# Patient Record
Sex: MALE | Race: BLACK OR AFRICAN AMERICAN | NOT HISPANIC OR LATINO | Employment: FULL TIME | URBAN - METROPOLITAN AREA
[De-identification: names, ages, dates, MRNs, and addresses within clinical notes are randomized per-mention and may not be internally consistent; named-entity substitution may affect disease eponyms.]

---

## 2019-12-13 ENCOUNTER — HOSPITAL ENCOUNTER (EMERGENCY)
Facility: HOSPITAL | Age: 34
Discharge: LEFT BEFORE DISCHARGE | End: 2019-12-14
Attending: EMERGENCY MEDICINE

## 2019-12-13 DIAGNOSIS — N17.9 AKI (ACUTE KIDNEY INJURY): ICD-10-CM

## 2019-12-13 DIAGNOSIS — R25.2 SPASM: Primary | ICD-10-CM

## 2019-12-13 DIAGNOSIS — R79.89 ELEVATED TROPONIN: ICD-10-CM

## 2019-12-13 DIAGNOSIS — R74.01 TRANSAMINITIS: ICD-10-CM

## 2019-12-13 DIAGNOSIS — R94.31 ABNORMAL EKG: ICD-10-CM

## 2019-12-13 PROCEDURE — 99284 EMERGENCY DEPT VISIT MOD MDM: CPT | Mod: 25

## 2019-12-13 PROCEDURE — 82962 GLUCOSE BLOOD TEST: CPT

## 2019-12-13 PROCEDURE — 96360 HYDRATION IV INFUSION INIT: CPT

## 2019-12-13 RX ORDER — INSULIN ASPART 100 [IU]/ML
INJECTION, SOLUTION INTRAVENOUS; SUBCUTANEOUS
COMMUNITY

## 2019-12-14 VITALS
WEIGHT: 225 LBS | BODY MASS INDEX: 28.88 KG/M2 | RESPIRATION RATE: 15 BRPM | HEIGHT: 74 IN | TEMPERATURE: 99 F | HEART RATE: 94 BPM | DIASTOLIC BLOOD PRESSURE: 64 MMHG | OXYGEN SATURATION: 98 % | SYSTOLIC BLOOD PRESSURE: 154 MMHG

## 2019-12-14 LAB
ALBUMIN SERPL BCP-MCNC: 2.6 G/DL (ref 3.5–5.2)
ALP SERPL-CCNC: 280 U/L (ref 55–135)
ALT SERPL W/O P-5'-P-CCNC: 788 U/L (ref 10–44)
ANION GAP SERPL CALC-SCNC: 12 MMOL/L (ref 8–16)
AST SERPL-CCNC: 313 U/L (ref 10–40)
BASOPHILS # BLD AUTO: 0.04 K/UL (ref 0–0.2)
BASOPHILS NFR BLD: 0.4 % (ref 0–1.9)
BILIRUB SERPL-MCNC: 0.4 MG/DL (ref 0.1–1)
BUN SERPL-MCNC: 27 MG/DL (ref 6–20)
CALCIUM SERPL-MCNC: 9.3 MG/DL (ref 8.7–10.5)
CHLORIDE SERPL-SCNC: 100 MMOL/L (ref 95–110)
CK SERPL-CCNC: 136 U/L (ref 20–200)
CO2 SERPL-SCNC: 21 MMOL/L (ref 23–29)
CREAT SERPL-MCNC: 2.6 MG/DL (ref 0.5–1.4)
DIFFERENTIAL METHOD: ABNORMAL
EOSINOPHIL # BLD AUTO: 0.2 K/UL (ref 0–0.5)
EOSINOPHIL NFR BLD: 1.6 % (ref 0–8)
ERYTHROCYTE [DISTWIDTH] IN BLOOD BY AUTOMATED COUNT: 13.2 % (ref 11.5–14.5)
EST. GFR  (AFRICAN AMERICAN): 36 ML/MIN/1.73 M^2
EST. GFR  (NON AFRICAN AMERICAN): 31 ML/MIN/1.73 M^2
GLUCOSE SERPL-MCNC: 410 MG/DL (ref 70–110)
HCT VFR BLD AUTO: 36.9 % (ref 40–54)
HGB BLD-MCNC: 12.5 G/DL (ref 14–18)
IMM GRANULOCYTES # BLD AUTO: 0.05 K/UL (ref 0–0.04)
IMM GRANULOCYTES NFR BLD AUTO: 0.5 % (ref 0–0.5)
LYMPHOCYTES # BLD AUTO: 2.3 K/UL (ref 1–4.8)
LYMPHOCYTES NFR BLD: 23.9 % (ref 18–48)
MAGNESIUM SERPL-MCNC: 2.1 MG/DL (ref 1.6–2.6)
MCH RBC QN AUTO: 27.4 PG (ref 27–31)
MCHC RBC AUTO-ENTMCNC: 33.9 G/DL (ref 32–36)
MCV RBC AUTO: 81 FL (ref 82–98)
MONOCYTES # BLD AUTO: 0.8 K/UL (ref 0.3–1)
MONOCYTES NFR BLD: 8.6 % (ref 4–15)
NEUTROPHILS # BLD AUTO: 6.4 K/UL (ref 1.8–7.7)
NEUTROPHILS NFR BLD: 65 % (ref 38–73)
NRBC BLD-RTO: 0 /100 WBC
PLATELET # BLD AUTO: 244 K/UL (ref 150–350)
PMV BLD AUTO: 11.8 FL (ref 9.2–12.9)
POCT GLUCOSE: 390 MG/DL (ref 70–110)
POTASSIUM SERPL-SCNC: 4.7 MMOL/L (ref 3.5–5.1)
PROT SERPL-MCNC: 6.6 G/DL (ref 6–8.4)
RBC # BLD AUTO: 4.56 M/UL (ref 4.6–6.2)
SODIUM SERPL-SCNC: 133 MMOL/L (ref 136–145)
TROPONIN I SERPL DL<=0.01 NG/ML-MCNC: 0.04 NG/ML (ref 0–0.03)
WBC # BLD AUTO: 9.81 K/UL (ref 3.9–12.7)

## 2019-12-14 PROCEDURE — 83735 ASSAY OF MAGNESIUM: CPT

## 2019-12-14 PROCEDURE — 85025 COMPLETE CBC W/AUTO DIFF WBC: CPT

## 2019-12-14 PROCEDURE — 84484 ASSAY OF TROPONIN QUANT: CPT

## 2019-12-14 PROCEDURE — 63600175 PHARM REV CODE 636 W HCPCS: Performed by: EMERGENCY MEDICINE

## 2019-12-14 PROCEDURE — 82550 ASSAY OF CK (CPK): CPT

## 2019-12-14 PROCEDURE — 80053 COMPREHEN METABOLIC PANEL: CPT

## 2019-12-14 RX ADMIN — SODIUM CHLORIDE 1000 ML: 0.9 INJECTION, SOLUTION INTRAVENOUS at 12:12

## 2019-12-14 NOTE — ED NOTES
Pt and spouse at bedside verbalize understanding of AMA and sign from. All questions answered at this time. Pt states he will follow up with his PCP on Monday. No distress is noted at this time. Pt leaving with family.

## 2019-12-14 NOTE — ED NOTES
Plan of care discussed with pt. Pt states he does not want to be admitted at this time and will leave against medical advice. MD made aware.

## 2019-12-14 NOTE — ED PROVIDER NOTES
Encounter Date: 12/13/2019    SCRIBE #1 NOTE: I, Janellekaitlin Holden, am scribing for, and in the presence of,  Lewis Sheldon MD. I have scribed the following portions of the note - Other sections scribed: HPI, ROS, PE.       History     Chief Complaint   Patient presents with    Spasms     reports having muscle cramps to entire body for the past 1 hour after drinking a daiquiri tonight      CC: Myalgias    HPI: The patient is a 34 y.o male who presents to the ED, per EMS, complaining of cramping, generalized myalgias that began about 1 hr PTA. He states that cramps began at his feet, then migrated towards his upper extremities. Patient reports crying due to severity of pain. He states that he attempted to alleviate symptoms via walking, with no relief. Hx of similar muscles aches. Symptoms have improved some since arrival. He denies abdominal pain, CP, and leg swelling. PMHx of HTN and DM. Patient was diagnosed at 22 y.o. He takes lantus and novlog for management. SHx of smoking cigarettes and alcohol consumption. No SHx of drug use.     The history is provided by the patient. No  was used.     Review of patient's allergies indicates:  No Known Allergies  Past Medical History:   Diagnosis Date    Diabetes mellitus     Hypertension      History reviewed. No pertinent surgical history.  History reviewed. No pertinent family history.  Social History     Tobacco Use    Smoking status: Current Every Day Smoker    Smokeless tobacco: Never Used   Substance Use Topics    Alcohol use: Yes    Drug use: Never     Review of Systems   Constitutional: Negative for fever.   HENT: Negative for sore throat.    Respiratory: Negative for shortness of breath.    Cardiovascular: Negative for chest pain and leg swelling.   Gastrointestinal: Negative for abdominal pain.   Genitourinary: Negative for dysuria.   Musculoskeletal: Positive for myalgias (Generalized). Negative for back pain.   Skin: Negative for  rash.   Neurological: Negative for headaches.   Psychiatric/Behavioral: Negative for confusion.       Physical Exam     Initial Vitals [12/13/19 2301]   BP Pulse Resp Temp SpO2   (!) 210/100 (!) 120 20 98.5 °F (36.9 °C) 98 %      MAP       --         Physical Exam    Nursing note and vitals reviewed.  Constitutional: He appears well-developed and well-nourished. He is not diaphoretic. No distress.   HENT:   Head: Normocephalic and atraumatic.   Mouth/Throat: Oropharynx is clear and moist. Mucous membranes are dry.   Patient has dry mucous membranes.    Eyes: Conjunctivae and EOM are normal. Pupils are equal, round, and reactive to light. Right eye exhibits no discharge. Left eye exhibits no discharge. No scleral icterus.   Neck: Normal range of motion. Neck supple.   Cardiovascular: Normal rate, regular rhythm, normal heart sounds and intact distal pulses. Exam reveals no gallop and no friction rub.    No murmur heard.  Pulmonary/Chest: Breath sounds normal. No stridor. No respiratory distress. He has no wheezes. He has no rhonchi. He has no rales.   Abdominal: Soft. Bowel sounds are normal. He exhibits no distension. There is no tenderness. There is no rebound and no guarding.   Musculoskeletal: Normal range of motion. He exhibits no edema or tenderness.   Neurological: He is alert and oriented to person, place, and time. He has normal strength. No cranial nerve deficit.   Skin: Skin is warm and dry. No rash noted.   Psychiatric: He has a normal mood and affect. His behavior is normal. Judgment and thought content normal.         ED Course   Procedures  Labs Reviewed   CBC W/ AUTO DIFFERENTIAL - Abnormal; Notable for the following components:       Result Value    RBC 4.56 (*)     Hemoglobin 12.5 (*)     Hematocrit 36.9 (*)     Mean Corpuscular Volume 81 (*)     Immature Grans (Abs) 0.05 (*)     All other components within normal limits   COMPREHENSIVE METABOLIC PANEL - Abnormal; Notable for the following  components:    Sodium 133 (*)     CO2 21 (*)     Glucose 410 (*)     BUN, Bld 27 (*)     Creatinine 2.6 (*)     Albumin 2.6 (*)     Alkaline Phosphatase 280 (*)      (*)      (*)     eGFR if  36 (*)     eGFR if non  31 (*)     All other components within normal limits   TROPONIN I - Abnormal; Notable for the following components:    Troponin I 0.045 (*)     All other components within normal limits   POCT GLUCOSE - Abnormal; Notable for the following components:    POCT Glucose 390 (*)     All other components within normal limits   MAGNESIUM   CK   POCT GLUCOSE MONITORING CONTINUOUS          Imaging Results    None          Medical Decision Making:   Initial Assessment:   34-year-old male presenting with body cramping.  Currently asymptomatic.  Vitals with minimal hypertension.  EKG shows minimal tachycardia, sinus, rate 101, T-wave inversions in 2 3 AVF as well as V 4-V6.  Unclear if he EKG changes are new or old as there are no priors to compare with.  Labs with multiple abnormalities including elevated troponin, kidney insufficiency, transaminitis.  Unclear what his symptoms were however there are multiple concerning findings.  I recommend to the patient that he be observed especially given his EKG changes and elevated troponin.  The patient elected to sign out against medical advice.  I attempted to convey the gravity of the situation and the unknown cause of his symptoms.  The patient states he will follow up with his primary care and only desires to go home.  I have no reason to suspect the patient does not have capability to understand the consequences of his actions. He did not appear intoxicated, deranged, or altered. Patient encouraged to return for any new or worsening condition.    Clinical Tests:   Lab Tests: Ordered and Reviewed            Scribe Attestation:   Scribe #1: I performed the above scribed service and the documentation accurately describes  the services I performed. I attest to the accuracy of the note.              I, Lewis Sheldon, personally performed the services described in this documentation. All medical record entries made by the scribe were at my direction and in my presence.  I have reviewed the chart and agree that the record reflects my personal performance and is accurate and complete.               Clinical Impression:       ICD-10-CM ICD-9-CM   1. Spasm R25.2 781.0   2. NORAH (acute kidney injury) N17.9 584.9   3. Abnormal EKG R94.31 794.31   4. Elevated troponin R79.89 790.6   5. Transaminitis R74.0 790.4         Disposition:   Disposition: AMA  Condition: Stable                     Lewis Sheldon MD  12/14/19 0338

## 2019-12-14 NOTE — ED TRIAGE NOTES
Pt presents to ED via EMS with c/o muscle cramping for the past month and it got worse tonight. He reports tonight he drank a daiquiri and shortly after began having cramping from his feet to his arms. At this time pt reports only cramping in his feet. Pt reports he worked outside on his car today but did drink lots of water. No distress is noted at this time. Will continue to be monitored.

## 2019-12-14 NOTE — DISCHARGE INSTRUCTIONS
You were seen in the emergency department for cramps.  Your EKG is concerning for changes that could be due to heart disease or a prior heart attack. Your kidneys have been damaged. Your liver is not function as it should and could be due to liver disease or infection. We have recommended to stay in the hospital to ensure these problems are not getting worse and are not caused by a dangerous cause such as a heart attack. You have decided to leave against medication advice. Please follow-up with your primary care provider early this week.  Please return for any new or worsening chest pain, nausea, vomiting, difficulty breathing, coughing up blood, profuse sweating, dizziness, lightheadedness, numbness, weakness, or any other new or worsening concerns.

## 2020-07-24 ENCOUNTER — HOSPITAL ENCOUNTER (INPATIENT)
Facility: HOSPITAL | Age: 35
LOS: 7 days | Discharge: HOME | DRG: 853 | End: 2020-07-31
Attending: EMERGENCY MEDICINE | Admitting: EMERGENCY MEDICINE
Payer: MEDICAID

## 2020-07-24 DIAGNOSIS — L03.116 CELLULITIS OF LEFT LOWER EXTREMITY: ICD-10-CM

## 2020-07-24 DIAGNOSIS — D72.829 LEUKOCYTOSIS, UNSPECIFIED TYPE: ICD-10-CM

## 2020-07-24 DIAGNOSIS — A49.01 MSSA (METHICILLIN SUSCEPTIBLE STAPHYLOCOCCUS AUREUS): Primary | ICD-10-CM

## 2020-07-24 DIAGNOSIS — N17.9 AKI (ACUTE KIDNEY INJURY): ICD-10-CM

## 2020-07-24 DIAGNOSIS — L02.416 ABSCESS OF LEFT THIGH: ICD-10-CM

## 2020-07-24 PROBLEM — R79.89 ELEVATED SERUM CREATININE: Status: ACTIVE | Noted: 2020-07-24

## 2020-07-24 PROBLEM — E78.5 HLD (HYPERLIPIDEMIA): Status: ACTIVE | Noted: 2020-07-24

## 2020-07-24 PROBLEM — E10.9 DIABETES MELLITUS TYPE 1: Status: ACTIVE | Noted: 2020-07-24

## 2020-07-24 PROBLEM — A41.9 SEPSIS: Status: ACTIVE | Noted: 2020-07-24

## 2020-07-24 PROBLEM — I10 ESSENTIAL HYPERTENSION: Status: ACTIVE | Noted: 2020-07-24

## 2020-07-24 LAB
ALBUMIN SERPL BCP-MCNC: 1.6 G/DL (ref 3.5–5.2)
ALP SERPL-CCNC: 106 U/L (ref 55–135)
ALT SERPL W/O P-5'-P-CCNC: 6 U/L (ref 10–44)
ANION GAP SERPL CALC-SCNC: 9 MMOL/L (ref 8–16)
AST SERPL-CCNC: 12 U/L (ref 10–40)
BASOPHILS # BLD AUTO: 0.04 K/UL (ref 0–0.2)
BASOPHILS NFR BLD: 0.2 % (ref 0–1.9)
BILIRUB SERPL-MCNC: 0.3 MG/DL (ref 0.1–1)
BUN SERPL-MCNC: 33 MG/DL (ref 6–20)
CALCIUM SERPL-MCNC: 8.5 MG/DL (ref 8.7–10.5)
CHLORIDE SERPL-SCNC: 103 MMOL/L (ref 95–110)
CO2 SERPL-SCNC: 21 MMOL/L (ref 23–29)
CREAT SERPL-MCNC: 2.1 MG/DL (ref 0.5–1.4)
DIFFERENTIAL METHOD: ABNORMAL
EOSINOPHIL # BLD AUTO: 0 K/UL (ref 0–0.5)
EOSINOPHIL NFR BLD: 0.1 % (ref 0–8)
ERYTHROCYTE [DISTWIDTH] IN BLOOD BY AUTOMATED COUNT: 12.3 % (ref 11.5–14.5)
EST. GFR  (AFRICAN AMERICAN): 46 ML/MIN/1.73 M^2
EST. GFR  (NON AFRICAN AMERICAN): 40 ML/MIN/1.73 M^2
GLUCOSE SERPL-MCNC: 160 MG/DL (ref 70–110)
GLUCOSE SERPL-MCNC: 202 MG/DL (ref 70–110)
HCT VFR BLD AUTO: 34 % (ref 40–54)
HGB BLD-MCNC: 11.6 G/DL (ref 14–18)
IMM GRANULOCYTES # BLD AUTO: 0.35 K/UL (ref 0–0.04)
IMM GRANULOCYTES NFR BLD AUTO: 1.6 % (ref 0–0.5)
LACTATE SERPL-SCNC: 1.5 MMOL/L (ref 0.5–2.2)
LYMPHOCYTES # BLD AUTO: 1.7 K/UL (ref 1–4.8)
LYMPHOCYTES NFR BLD: 7.6 % (ref 18–48)
MCH RBC QN AUTO: 28.2 PG (ref 27–31)
MCHC RBC AUTO-ENTMCNC: 34.1 G/DL (ref 32–36)
MCV RBC AUTO: 83 FL (ref 82–98)
MONOCYTES # BLD AUTO: 1.6 K/UL (ref 0.3–1)
MONOCYTES NFR BLD: 7 % (ref 4–15)
NEUTROPHILS # BLD AUTO: 18.8 K/UL (ref 1.8–7.7)
NEUTROPHILS NFR BLD: 83.5 % (ref 38–73)
NRBC BLD-RTO: 0 /100 WBC
PLATELET # BLD AUTO: 222 K/UL (ref 150–350)
PMV BLD AUTO: 11.9 FL (ref 9.2–12.9)
POCT GLUCOSE: 146 MG/DL (ref 70–110)
POCT GLUCOSE: 202 MG/DL (ref 70–110)
POTASSIUM SERPL-SCNC: 3.4 MMOL/L (ref 3.5–5.1)
PROT SERPL-MCNC: 6.4 G/DL (ref 6–8.4)
RBC # BLD AUTO: 4.12 M/UL (ref 4.6–6.2)
SARS-COV-2 RDRP RESP QL NAA+PROBE: NEGATIVE
SODIUM SERPL-SCNC: 133 MMOL/L (ref 136–145)
WBC # BLD AUTO: 22.45 K/UL (ref 3.9–12.7)

## 2020-07-24 PROCEDURE — 96372 THER/PROPH/DIAG INJ SC/IM: CPT | Mod: 59

## 2020-07-24 PROCEDURE — G0378 HOSPITAL OBSERVATION PER HR: HCPCS

## 2020-07-24 PROCEDURE — 25000003 PHARM REV CODE 250: Performed by: NURSE PRACTITIONER

## 2020-07-24 PROCEDURE — 25000003 PHARM REV CODE 250: Performed by: PHYSICIAN ASSISTANT

## 2020-07-24 PROCEDURE — 63600175 PHARM REV CODE 636 W HCPCS: Performed by: PHYSICIAN ASSISTANT

## 2020-07-24 PROCEDURE — 85025 COMPLETE CBC W/AUTO DIFF WBC: CPT

## 2020-07-24 PROCEDURE — 87186 SC STD MICRODIL/AGAR DIL: CPT

## 2020-07-24 PROCEDURE — 99285 EMERGENCY DEPT VISIT HI MDM: CPT | Mod: 25

## 2020-07-24 PROCEDURE — C9399 UNCLASSIFIED DRUGS OR BIOLOG: HCPCS | Performed by: SURGERY

## 2020-07-24 PROCEDURE — 87077 CULTURE AEROBIC IDENTIFY: CPT

## 2020-07-24 PROCEDURE — U0002 COVID-19 LAB TEST NON-CDC: HCPCS

## 2020-07-24 PROCEDURE — 96375 TX/PRO/DX INJ NEW DRUG ADDON: CPT

## 2020-07-24 PROCEDURE — 25000003 PHARM REV CODE 250: Performed by: SURGERY

## 2020-07-24 PROCEDURE — C9399 UNCLASSIFIED DRUGS OR BIOLOG: HCPCS | Performed by: PHYSICIAN ASSISTANT

## 2020-07-24 PROCEDURE — 25500020 PHARM REV CODE 255: Performed by: EMERGENCY MEDICINE

## 2020-07-24 PROCEDURE — 96376 TX/PRO/DX INJ SAME DRUG ADON: CPT

## 2020-07-24 PROCEDURE — 83605 ASSAY OF LACTIC ACID: CPT

## 2020-07-24 PROCEDURE — 11000001 HC ACUTE MED/SURG PRIVATE ROOM

## 2020-07-24 PROCEDURE — 10060 I&D ABSCESS SIMPLE/SINGLE: CPT

## 2020-07-24 PROCEDURE — 87070 CULTURE OTHR SPECIMN AEROBIC: CPT

## 2020-07-24 PROCEDURE — 87040 BLOOD CULTURE FOR BACTERIA: CPT | Mod: 59

## 2020-07-24 PROCEDURE — 96365 THER/PROPH/DIAG IV INF INIT: CPT

## 2020-07-24 PROCEDURE — S0030 INJECTION, METRONIDAZOLE: HCPCS | Performed by: PHYSICIAN ASSISTANT

## 2020-07-24 PROCEDURE — 82962 GLUCOSE BLOOD TEST: CPT

## 2020-07-24 PROCEDURE — 83036 HEMOGLOBIN GLYCOSYLATED A1C: CPT

## 2020-07-24 PROCEDURE — 80053 COMPREHEN METABOLIC PANEL: CPT

## 2020-07-24 RX ORDER — INSULIN ASPART 100 [IU]/ML
0-5 INJECTION, SOLUTION INTRAVENOUS; SUBCUTANEOUS
Status: DISCONTINUED | OUTPATIENT
Start: 2020-07-24 | End: 2020-07-31 | Stop reason: HOSPADM

## 2020-07-24 RX ORDER — ACETAMINOPHEN 325 MG/1
650 TABLET ORAL EVERY 8 HOURS PRN
Status: DISCONTINUED | OUTPATIENT
Start: 2020-07-24 | End: 2020-07-31 | Stop reason: HOSPADM

## 2020-07-24 RX ORDER — IBUPROFEN 200 MG
24 TABLET ORAL
Status: DISCONTINUED | OUTPATIENT
Start: 2020-07-24 | End: 2020-07-31 | Stop reason: HOSPADM

## 2020-07-24 RX ORDER — METRONIDAZOLE 500 MG/100ML
500 INJECTION, SOLUTION INTRAVENOUS
Status: DISCONTINUED | OUTPATIENT
Start: 2020-07-24 | End: 2020-07-25

## 2020-07-24 RX ORDER — ATORVASTATIN CALCIUM 10 MG/1
20 TABLET, FILM COATED ORAL NIGHTLY
Status: DISCONTINUED | OUTPATIENT
Start: 2020-07-24 | End: 2020-07-31 | Stop reason: HOSPADM

## 2020-07-24 RX ORDER — MORPHINE SULFATE 10 MG/ML
4 INJECTION INTRAMUSCULAR; INTRAVENOUS; SUBCUTANEOUS
Status: COMPLETED | OUTPATIENT
Start: 2020-07-24 | End: 2020-07-24

## 2020-07-24 RX ORDER — OXYCODONE AND ACETAMINOPHEN 5; 325 MG/1; MG/1
1 TABLET ORAL EVERY 4 HOURS PRN
Status: DISCONTINUED | OUTPATIENT
Start: 2020-07-24 | End: 2020-07-31 | Stop reason: HOSPADM

## 2020-07-24 RX ORDER — SODIUM CHLORIDE 9 MG/ML
INJECTION, SOLUTION INTRAVENOUS CONTINUOUS
Status: DISCONTINUED | OUTPATIENT
Start: 2020-07-24 | End: 2020-07-26

## 2020-07-24 RX ORDER — MORPHINE SULFATE 10 MG/ML
2 INJECTION INTRAMUSCULAR; INTRAVENOUS; SUBCUTANEOUS EVERY 4 HOURS PRN
Status: DISCONTINUED | OUTPATIENT
Start: 2020-07-24 | End: 2020-07-24

## 2020-07-24 RX ORDER — SODIUM CHLORIDE 0.9 % (FLUSH) 0.9 %
10 SYRINGE (ML) INJECTION
Status: DISCONTINUED | OUTPATIENT
Start: 2020-07-24 | End: 2020-07-31 | Stop reason: HOSPADM

## 2020-07-24 RX ORDER — MORPHINE SULFATE 10 MG/ML
2 INJECTION INTRAMUSCULAR; INTRAVENOUS; SUBCUTANEOUS
Status: COMPLETED | OUTPATIENT
Start: 2020-07-24 | End: 2020-07-24

## 2020-07-24 RX ORDER — AMOXICILLIN 250 MG
1 CAPSULE ORAL DAILY PRN
Status: DISCONTINUED | OUTPATIENT
Start: 2020-07-24 | End: 2020-07-31 | Stop reason: HOSPADM

## 2020-07-24 RX ORDER — GLUCAGON 1 MG
1 KIT INJECTION
Status: DISCONTINUED | OUTPATIENT
Start: 2020-07-24 | End: 2020-07-31 | Stop reason: HOSPADM

## 2020-07-24 RX ORDER — ONDANSETRON 2 MG/ML
4 INJECTION INTRAMUSCULAR; INTRAVENOUS EVERY 8 HOURS PRN
Status: DISCONTINUED | OUTPATIENT
Start: 2020-07-24 | End: 2020-07-31 | Stop reason: HOSPADM

## 2020-07-24 RX ORDER — HYDRALAZINE HYDROCHLORIDE 20 MG/ML
10 INJECTION INTRAMUSCULAR; INTRAVENOUS EVERY 8 HOURS PRN
Status: DISCONTINUED | OUTPATIENT
Start: 2020-07-24 | End: 2020-07-29

## 2020-07-24 RX ORDER — ACETAMINOPHEN 500 MG
1000 TABLET ORAL
Status: COMPLETED | OUTPATIENT
Start: 2020-07-24 | End: 2020-07-24

## 2020-07-24 RX ORDER — CLINDAMYCIN PHOSPHATE 600 MG/50ML
600 INJECTION, SOLUTION INTRAVENOUS
Status: COMPLETED | OUTPATIENT
Start: 2020-07-24 | End: 2020-07-24

## 2020-07-24 RX ORDER — MORPHINE SULFATE 10 MG/ML
4 INJECTION INTRAMUSCULAR; INTRAVENOUS; SUBCUTANEOUS EVERY 4 HOURS PRN
Status: DISCONTINUED | OUTPATIENT
Start: 2020-07-24 | End: 2020-07-24

## 2020-07-24 RX ORDER — LIDOCAINE HYDROCHLORIDE 10 MG/ML
10 INJECTION INFILTRATION; PERINEURAL
Status: COMPLETED | OUTPATIENT
Start: 2020-07-24 | End: 2020-07-24

## 2020-07-24 RX ORDER — IBUPROFEN 200 MG
16 TABLET ORAL
Status: DISCONTINUED | OUTPATIENT
Start: 2020-07-24 | End: 2020-07-31 | Stop reason: HOSPADM

## 2020-07-24 RX ADMIN — IOHEXOL 100 ML: 350 INJECTION, SOLUTION INTRAVENOUS at 04:07

## 2020-07-24 RX ADMIN — DOXYCYCLINE 100 MG: 100 INJECTION, POWDER, LYOPHILIZED, FOR SOLUTION INTRAVENOUS at 07:07

## 2020-07-24 RX ADMIN — SODIUM CHLORIDE: 0.9 INJECTION, SOLUTION INTRAVENOUS at 08:07

## 2020-07-24 RX ADMIN — LIDOCAINE HYDROCHLORIDE 10 ML: 10 INJECTION, SOLUTION INFILTRATION; PERINEURAL at 11:07

## 2020-07-24 RX ADMIN — CEFTRIAXONE 2 G: 2 INJECTION, SOLUTION INTRAVENOUS at 09:07

## 2020-07-24 RX ADMIN — METRONIDAZOLE 500 MG: 500 INJECTION, SOLUTION INTRAVENOUS at 07:07

## 2020-07-24 RX ADMIN — OXYCODONE HYDROCHLORIDE AND ACETAMINOPHEN 1 TABLET: 5; 325 TABLET ORAL at 08:07

## 2020-07-24 RX ADMIN — MORPHINE SULFATE 2 MG: 10 INJECTION INTRAVENOUS at 05:07

## 2020-07-24 RX ADMIN — ATORVASTATIN CALCIUM 20 MG: 10 TABLET, FILM COATED ORAL at 08:07

## 2020-07-24 RX ADMIN — MORPHINE SULFATE 4 MG: 10 INJECTION INTRAVENOUS at 03:07

## 2020-07-24 RX ADMIN — INSULIN DETEMIR 5 UNITS: 100 INJECTION, SOLUTION SUBCUTANEOUS at 09:07

## 2020-07-24 RX ADMIN — CLINDAMYCIN IN 5 PERCENT DEXTROSE 600 MG: 12 INJECTION, SOLUTION INTRAVENOUS at 01:07

## 2020-07-24 RX ADMIN — MORPHINE SULFATE 4 MG: 10 INJECTION INTRAVENOUS at 01:07

## 2020-07-24 RX ADMIN — ACETAMINOPHEN 650 MG: 325 TABLET ORAL at 11:07

## 2020-07-24 RX ADMIN — ACETAMINOPHEN 1000 MG: 500 TABLET ORAL at 01:07

## 2020-07-24 NOTE — HPI
Chas Henao 35 y.o. male with type 1 diabetes, HTN, and HLD presents to the hospital with a chief complaint of leg pain. He reports 1 week ago he saw what he thought was a pimple to his left medial thigh that he attempted to express without success and the abscess expanded. Over the last week he has experienced an increasing constant throbbing pain without radiation improved with I&D of abscess in the ED and worsened with ambulation. He denies any trauma or injury to the leg. He has not lost control of his bowel/bladder. He denies fever, chest pain, SOB, N/V, abdominal pain, dysuria, dizziness, syncope.     In the ED, tachycardic to 105, WBC of 22, abscess seen on CT, lactic acid normal, Cr of 2.1.

## 2020-07-24 NOTE — HOSPITAL COURSE
Chas Henao 35 y.o. male placed in observation for left upper thigh abscess, NORAH and sepsis.  I&D in ED. Rocephin/Flagyl/doxy started on admission. Patient received CT with contrast on admission. General surgery and Nephrology consulted. US renal showed normal side kidneys, bilateral kidneys and bilateral elevated renal indices. 7/25 Underwent I & D of left upper thigh area that showed some necrotizing fasciitis.  Antibiotics switched to vancomycin and meropenem IV.  Renal function continued to worsen.  On 07/26 urine output approximately 500 mL over 24 hr. Fever subsided, but persistent leukocytosis. Nephrology felt ATN and continue IVF.  Vanc stopped and patient started on Ancef.  ID recommending Ancef while inpatient and Cefadroxil upon discharge.  CT with no fluid collections.  Improving leukocytosis and Creat finally trending downwards.

## 2020-07-24 NOTE — ASSESSMENT & PLAN NOTE
"CT today with "Findings suggesting cellulitis involving the posteromedial proximal thigh soft tissues, with subcutaneous strand-like edema, extending to the fascial surface.  There is punctate subcutaneous emphysema within the superficial subcutaneous fat, adjacent to a small amount of disorganized fluid.  No discrete formed abscess at this time, however developing abscess is a consideration versus previously drained abscess."  -meets criteria for sepsis for WBC and tachycardia  -Started on Rocephin/Doxy/Flagyl as CKD and received IV contrast  -Surgery consulted  -Blood and wound cultures pending  "

## 2020-07-24 NOTE — ED PROVIDER NOTES
"Encounter Date: 7/24/2020    SCRIBE #1 NOTE: I, Mansi Donahue, am scribing for, and in the presence of,  Janae Sawyer PA-C. I have scribed the following portions of the note - Other sections scribed: HPI, ROS.       History     Chief Complaint   Patient presents with    Abscess     left groin abscess started x 2 weeks ago     CC: Abscess    HPI: This is a 35 y.o. M with history of HTN and DM who presents to the ED for evaluation of 2 week history of pain and swelling to L medial thigh. Pt reports there is associated "lump" that increased in size 1 week ago and became very painful within the last 5 days. Pain is 10/10, worse with palpation. He reports he is hesitant to ambulate due to severity of pain. He reports associated bloody and white discharge from the area intermittently. He has been taking Tylenol for the pain, which he last took 2 hours ago. Denies history of similar episodes. He has had decreased appetite and has not been monitoring his blood glucose level for the past few days due to this. He takes Levemir at bedtime and Novolog as needed. Pt denies fever, chills, abdominal pain, nausea, vomiting, dysuria, penile pain, penile discharge, testicular pain, testicular swelling, rectal pain    The history is provided by the patient. No  was used.     Review of patient's allergies indicates:  No Known Allergies  Past Medical History:   Diagnosis Date    Diabetes mellitus     Hypertension      History reviewed. No pertinent surgical history.  History reviewed. No pertinent family history.  Social History     Tobacco Use    Smoking status: Current Every Day Smoker    Smokeless tobacco: Never Used   Substance Use Topics    Alcohol use: Yes    Drug use: Never     Review of Systems   Constitutional: Positive for appetite change (decrease). Negative for chills and fever.   HENT: Negative for congestion, rhinorrhea and sore throat.    Respiratory: Negative for cough and shortness of " breath.    Cardiovascular: Negative for chest pain.   Gastrointestinal: Negative for abdominal pain, anal bleeding, blood in stool, diarrhea, nausea, rectal pain and vomiting.   Genitourinary: Negative for discharge, dysuria, penile pain and testicular pain.        (-) Testicular swelling   Musculoskeletal: Negative for back pain and neck pain.   Skin: Negative for rash.   Neurological: Negative for weakness.   Hematological: Does not bruise/bleed easily.       Physical Exam     Initial Vitals [07/24/20 1147]   BP Pulse Resp Temp SpO2   (!) 168/86 105 18 99.7 °F (37.6 °C) 100 %      MAP       --         Physical Exam    Nursing note and vitals reviewed.  Constitutional: He appears well-developed and well-nourished. No distress.   HENT:   Head: Normocephalic.   Right Ear: External ear normal.   Left Ear: External ear normal.   Eyes: Conjunctivae are normal.   Neck: Normal range of motion.   Cardiovascular: Intact distal pulses.   Pulses:       Dorsalis pedis pulses are 2+ on the right side and 2+ on the left side.   Abdominal: Soft. He exhibits no distension. There is no abdominal tenderness. There is no rebound and no guarding.   Musculoskeletal: Normal range of motion.   Neurological: He is alert. No sensory deficit.   Skin: Skin is warm and dry. Abscess noted.   17 x 12 cm area of induration with 2 areas of fluctuance to L posteromedial thigh. No rectal involvement. No testicular pain or tenderness. No crepitus    Psychiatric: He has a normal mood and affect.         ED Course   I & D - Incision and Drainage    Date/Time: 7/24/2020 4:00 PM  Location procedure was performed: [facility] EMERGENCY DEPARTMENT  Performed by: Janae Sawyer PA-C  Authorized by: Arian Leong MD   Pre-operative diagnosis: left thigh abscess  Consent Done: Yes  Consent: Verbal consent obtained.  Consent given by: patient  Type: abscess  Body area: lower extremity  Anesthesia: local infiltration    Anesthesia:  Local Anesthetic:  lidocaine 1% without epinephrine  Anesthetic total: 10 mL  Description of findings: large    Scalpel size: 11  Incision type: single straight (2)  Complexity: simple  Drainage: bloody and  purulent  Drainage amount: moderate  Wound treatment: incision,  drainage,  deloculation,  expression of material and  wound left open  Patient tolerance: Patient tolerated the procedure well with no immediate complications  Comments: 2 straight incisions made in the left thigh           Labs Reviewed   CBC W/ AUTO DIFFERENTIAL - Abnormal; Notable for the following components:       Result Value    WBC 22.45 (*)     RBC 4.12 (*)     Hemoglobin 11.6 (*)     Hematocrit 34.0 (*)     Immature Granulocytes 1.6 (*)     Gran # (ANC) 18.8 (*)     Immature Grans (Abs) 0.35 (*)     Mono # 1.6 (*)     Gran% 83.5 (*)     Lymph% 7.6 (*)     All other components within normal limits   COMPREHENSIVE METABOLIC PANEL - Abnormal; Notable for the following components:    Sodium 133 (*)     Potassium 3.4 (*)     CO2 21 (*)     Glucose 202 (*)     BUN, Bld 33 (*)     Creatinine 2.1 (*)     Calcium 8.5 (*)     Albumin 1.6 (*)     ALT 6 (*)     eGFR if  46 (*)     eGFR if non  40 (*)     All other components within normal limits    Narrative:     Recoll. [id number] by BL1 at 07/24/2020 13:46, reason: Specimen   hemolyzed.  Tube has been refrigerated.  Called to ARIELLE Flowers @ 13:45 on 07/24/2020.  BML   POCT GLUCOSE - Abnormal; Notable for the following components:    POCT Glucose 202 (*)     All other components within normal limits   CULTURE, BLOOD   CULTURE, BLOOD   CULTURE, AEROBIC  (SPECIFY SOURCE)   LACTIC ACID, PLASMA   SARS-COV-2 RNA AMPLIFICATION, QUAL   HEMOGLOBIN A1C   POCT GLUCOSE MONITORING CONTINUOUS          Imaging Results           CT Abdomen Pelvis With Contrast (Final result)  Result time 07/24/20 16:53:55    Final result by Gilles Cifuentes MD (07/24/20 16:53:55)                 Impression:       This report was flagged in Epic as abnormal.    1. Findings suggesting cellulitis involving the posteromedial proximal thigh soft tissues, with subcutaneous strand-like edema, extending to the fascial surface.  There is punctate subcutaneous emphysema within the superficial subcutaneous fat, adjacent to a small amount of disorganized fluid.  No discrete formed abscess at this time, however developing abscess is a consideration versus previously drained abscess.  Correlation is advised.  2. Right lower lobe pulmonary nodule.  Comparison with any previous examinations would be helpful.  For a solid nodule >8 mm, Fleischner Society 2017 guidelines recommend considering CT, PET/CT or tissue sampling at 3 months.  3. Probable left renal cyst however nonemergent ultrasound is recommended to confirm cystic nature.  4. Findings suggesting hepatic steatosis, correlation with LFTs recommended.  5. Mild bilateral perinephric fat stranding, nonspecific, correlation with urinalysis advised.  6. Several additional findings above.      Electronically signed by: Gilles Cifuentes MD  Date:    07/24/2020  Time:    16:53             Narrative:    EXAMINATION:  CT ABDOMEN PELVIS WITH CONTRAST    CLINICAL HISTORY:  left inner thigh abscess;    TECHNIQUE:  Low dose axial images, sagittal and coronal reformations were obtained from the lung bases to the pubic symphysis following the IV administration of 100 mL of Omnipaque 350 .  Oral contrast was not given.    COMPARISON:  None.    FINDINGS:  Images of the lower thorax are remarkable for a pulmonary nodule within the right lower lobe measuring 0.9-1 cm.    The liver is hypoattenuating, suggesting steatosis, correlation with LFTs recommended.  The spleen, pancreas, gallbladder and adrenal glands are unremarkable.  There is no biliary dilation or ascites.  The stomach is decompressed.  The portal vein, splenic vein, SMV, celiac axis and SMA all are patent.  No significant abdominal  lymphadenopathy.    The kidneys enhance symmetrically without hydronephrosis or nephrolithiasis.  There is mild bilateral perinephric fat stranding.  Several low attenuating lesions arise from the kidneys bilaterally, the majority of which are too small for characterization, largest within the interpolar region of the left kidney measures 3 cm, attenuation of which is higher than would be expected for a simple cyst although attenuation measurement is limited given its location.  The bilateral ureters are unremarkable without calculi seen.  The urinary bladder is distended without wall thickening.  The prostate is not enlarged.    The distal large bowel is decompressed.  There are a few scattered colonic diverticula without inflammation.  The terminal ileum and appendix are unremarkable.  The small bowel is unremarkable.  Several scattered shotty periaortic and paracaval lymph nodes are noted.  There is atherosclerotic calcification of the aorta and its branches.    No acute osseous destructive process.  Degenerative changes are noted of the spine.  There is mild left inguinal lymphadenopathy.  There is soft tissue induration throughout the subcutaneous fat involving the proximal left medial and posterior thigh.  Along the medial aspect of the thigh, there are 2 punctate foci of subcutaneous emphysema in the region of induration however no discrete focal organized fluid collection.  Edema tracks along the muscular fascial planes of the proximal left thigh.  There is overlying skin thickening.                                 Medical Decision Making:   Initial Assessment:   36 y/o male with history of HTN, DM presenting for evaluation of 2 weeks history of left thigh abscess worsening within past 5 days  Differential Diagnosis:   Abscess, cellulitis, sepsis, necrotizing fascitis, perirectal abscess, among others.   ED Management:  Patient is afebrile nontoxic appearing.  Appears uncomfortable due to pain.  Mildly  tachycardic.  CBC with leukocytosis WBC 22. Lactic normal. Blood cultures drawn. First dose clindamycin given in ED.     CMP with BUN/Cr 33/2.1 similar to baseline. COVID negative.     Discussed pt with Dr. Durham, General Surgery who recommends I&D in ED.   Incision and drainage performed per procedure note. Wound culture collected.   CT with contrast performed after incision and drainage to assess the area after I&D.  CT finding suggestive of cellulitis involving posteromedial thigh and subcutaneous edema. Dr. Leong discussed the pt with radiology who reports CT not concerning for necrotizing fascitis. moreso consistent with recent I&D.    Discussed pt with Mark Morales PA-C, hospital medicine, who evaluated pt in ED and accepts patient for observation of patient for IV antibiotics and further monitoring given history of DM, size of abscess and location with leukocytosis.    Dr. Durham was updated on pt and informed of I&D and observation with . Inpatient general surgery consult placed.    Discussed patient with Dr. Leong who also evaluated pt face to face and he agrees with assessment and plan.                                  Clinical Impression:       ICD-10-CM ICD-9-CM   1. Abscess of left thigh  L02.416 682.6   2. Cellulitis of left lower extremity  L03.116 682.6   3. Leukocytosis, unspecified type  D72.829 288.60             ED Disposition Condition    Observation               LISY, Janae Sawyer PA-C, personally performed the services described in this documentation. All medical record entries made by the scribe were at my direction and in my presence.  I have reviewed the chart and agree that the record reflects my personal performance and is accurate and complete.

## 2020-07-24 NOTE — ASSESSMENT & PLAN NOTE
Cr of 2.1 on arrival. Last to compare in December of last year 2.6. Unclear as to baseline. Started on IVF as received IV contrast. Retroperitoneal US and UA pending. Repeat renal function in the morning.

## 2020-07-24 NOTE — ASSESSMENT & PLAN NOTE
Fair control. Holding home losartan for elevated creatinine and received IV contrast. Holding losartan. PRN hydralazine

## 2020-07-24 NOTE — PROVIDER PROGRESS NOTES - EMERGENCY DEPT.
Emergency Department TeleTRIAGE Encounter Note      CHIEF COMPLAINT    Chief Complaint   Patient presents with    Abscess     left groin abscess started x 2 weeks ago       VITAL SIGNS   Initial Vitals [07/24/20 1147]   BP Pulse Resp Temp SpO2   (!) 168/86 105 18 99.7 °F (37.6 °C) 100 %      MAP       --            ALLERGIES    Review of patient's allergies indicates:  No Known Allergies    PROVIDER TRIAGE NOTE  This is a teletriage evaluation of a 35 y.o. male presenting to the ED with c/o left groin abscess - 2 weeks. Initial orders will be placed and care will be transferred to an alternate provider when patient is roomed for a full evaluation. Any additional orders and the final disposition will be determined by that provider.         ORDERS  Labs Reviewed - No data to display    ED Orders (720h ago, onward)    Start Ordered     Status Ordering Provider    07/24/20 1200 07/24/20 1150  lidocaine HCL 10 mg/ml (1%) injection 10 mL  ED 1 Time      Ordered SHARMILA NULL    07/24/20 1151 07/24/20 1150  POCT glucose  Once      Ordered SHARMILA NULL    07/24/20 1151 07/24/20 1150  Bring I&D Tray to patient bedside  Once      Ordered SHARMILA NULL    07/24/20 1151 07/24/20 1150  Apply dressing  Once      Ordered SHARMILA NULL            Virtual Visit Note: The provider triage portion of this emergency department evaluation and documentation was performed via [employer], a HIPAA-compliant telemedicine application, in concert with a tele-presenter in the room. A face to face patient evaluation with one of my colleagues will occur once the patient is placed in an emergency department room.      DISCLAIMER: This note was prepared with [employer] voice recognition transcription software. Garbled syntax, mangled pronouns, and other bizarre constructions may be attributed to that software system.

## 2020-07-24 NOTE — ASSESSMENT & PLAN NOTE
Meets criteria for sepsis based on WBC and tachycardia. Started on antibiotics with blood and wound cultures pending

## 2020-07-24 NOTE — H&P
[facility] Hospital Medicine  History & Physical    Patient Name: Chas Henao  MRN: [id number]  Admission Date: 7/24/2020  Attending Physician: Ada Perez MD  Primary Care Provider: Primary Doctor No         Patient information was obtained from patient, past medical records and ER records.     Subjective:     Principal Problem:Abscess of left thigh    Chief Complaint:   Chief Complaint   Patient presents with    Abscess     left groin abscess started x 2 weeks ago        HPI: Chas Henao 35 y.o. male with type 1 diabetes, HTN, and HLD presents to the hospital with a chief complaint of leg pain. He reports 1 week ago he saw what he thought was a pimple to his left medial thigh that he attempted to express without success and the abscess expanded. Over the last week he has experienced an increasing constant throbbing pain without radiation improved with I&D of abscess in the ED and worsened with ambulation. He denies any trauma or injury to the leg. He has not lost control of his bowel/bladder. He denies fever, chest pain, SOB, N/V, abdominal pain, dysuria, dizziness, syncope.     In the ED, tachycardic to 105, WBC of 22, abscess seen on CT, lactic acid normal, Cr of 2.1.     Past Medical History:   Diagnosis Date    Diabetes mellitus     Hypertension        History reviewed. No pertinent surgical history.    Review of patient's allergies indicates:  No Known Allergies    No current facility-administered medications on file prior to encounter.      Current Outpatient Medications on File Prior to Encounter   Medication Sig    insulin aspart U-100 (NOVOLOG) 100 unit/mL injection Inject into the skin 3 (three) times daily before meals.    UNKNOWN TO PATIENT     UNKNOWN TO PATIENT      Family History     None        Tobacco Use    Smoking status: Current Every Day Smoker    Smokeless tobacco: Never Used   Substance and Sexual Activity    Alcohol use: Yes    Drug use: Never     Sexual activity: Not on file     Review of Systems   Constitutional: Negative for chills and fever.   HENT: Negative for nosebleeds and tinnitus.    Eyes: Negative for photophobia and visual disturbance.   Respiratory: Negative for shortness of breath and wheezing.    Cardiovascular: Negative for chest pain, palpitations and leg swelling.   Gastrointestinal: Negative for abdominal distention, nausea and vomiting.   Genitourinary: Negative for dysuria, flank pain and hematuria.   Musculoskeletal: Negative for gait problem and joint swelling.   Skin: Positive for color change, rash and wound.   Neurological: Negative for seizures and syncope.     Objective:     Vital Signs (Most Recent):  Temp: 98.1 °F (36.7 °C) (07/24/20 1622)  Pulse: 96 (07/24/20 1622)  Resp: 18 (07/24/20 1758)  BP: 133/79 (07/24/20 1622)  SpO2: 99 % (07/24/20 1622) Vital Signs (24h Range):  Temp:  [98.1 °F (36.7 °C)-99.7 °F (37.6 °C)] 98.1 °F (36.7 °C)  Pulse:  [] 96  Resp:  [18] 18  SpO2:  [99 %-100 %] 99 %  BP: (133-168)/(79-86) 133/79     Weight: 102.1 kg (225 lb)  Body mass index is 28.89 kg/m².    Physical Exam  Vitals signs and nursing note reviewed.   Constitutional:       General: He is not in acute distress.     Appearance: He is well-developed.   HENT:      Head: Normocephalic and atraumatic.      Right Ear: External ear normal.      Left Ear: External ear normal.   Eyes:      General:         Right eye: No discharge.         Left eye: No discharge.      Conjunctiva/sclera: Conjunctivae normal.   Neck:      Musculoskeletal: Normal range of motion.      Thyroid: No thyromegaly.   Cardiovascular:      Rate and Rhythm: Normal rate and regular rhythm.      Heart sounds: No murmur.   Pulmonary:      Effort: Pulmonary effort is normal. No respiratory distress.      Breath sounds: Normal breath sounds.   Abdominal:      General: Bowel sounds are normal. There is no distension.      Palpations: Abdomen is soft. There is no mass.       Tenderness: There is no abdominal tenderness.   Musculoskeletal:         General: No deformity.      Comments: Area of indurance to left medial thigh with active bloody drainage. Being I&D by ED staff. Does not appear to involved rectum. Janae Sawyer PA-C and Dr. Puga of ED at bedside during exam   Skin:     General: Skin is warm and dry.   Neurological:      Mental Status: He is alert and oriented to person, place, and time.   Psychiatric:         Behavior: Behavior normal.             Significant Labs:   CBC:   Recent Labs   Lab 07/24/20  1302   WBC 22.45*   HGB 11.6*   HCT 34.0*        CMP:   Recent Labs   Lab 07/24/20  1536   *   K 3.4*      CO2 21*   *   BUN 33*   CREATININE 2.1*   CALCIUM 8.5*   PROT 6.4   ALBUMIN 1.6*   BILITOT 0.3   ALKPHOS 106   AST 12   ALT 6*   ANIONGAP 9   EGFRNONAA 40*     Lactic Acid:   Recent Labs   Lab 07/24/20  1302   LACTATE 1.5       Significant Imaging:   Imaging Results           CT Abdomen Pelvis With Contrast (Final result)  Result time 07/24/20 16:53:55    Final result by Gilles Cifuentes MD (07/24/20 16:53:55)                 Impression:      This report was flagged in Epic as abnormal.    1. Findings suggesting cellulitis involving the posteromedial proximal thigh soft tissues, with subcutaneous strand-like edema, extending to the fascial surface.  There is punctate subcutaneous emphysema within the superficial subcutaneous fat, adjacent to a small amount of disorganized fluid.  No discrete formed abscess at this time, however developing abscess is a consideration versus previously drained abscess.  Correlation is advised.  2. Right lower lobe pulmonary nodule.  Comparison with any previous examinations would be helpful.  For a solid nodule >8 mm, Fleischner Society 2017 guidelines recommend considering CT, PET/CT or tissue sampling at 3 months.  3. Probable left renal cyst however nonemergent ultrasound is recommended to confirm cystic  nature.  4. Findings suggesting hepatic steatosis, correlation with LFTs recommended.  5. Mild bilateral perinephric fat stranding, nonspecific, correlation with urinalysis advised.  6. Several additional findings above.      Electronically signed by: Gilles Cifuentes MD  Date:    07/24/2020  Time:    16:53             Narrative:    EXAMINATION:  CT ABDOMEN PELVIS WITH CONTRAST    CLINICAL HISTORY:  left inner thigh abscess;    TECHNIQUE:  Low dose axial images, sagittal and coronal reformations were obtained from the lung bases to the pubic symphysis following the IV administration of 100 mL of Omnipaque 350 .  Oral contrast was not given.    COMPARISON:  None.    FINDINGS:  Images of the lower thorax are remarkable for a pulmonary nodule within the right lower lobe measuring 0.9-1 cm.    The liver is hypoattenuating, suggesting steatosis, correlation with LFTs recommended.  The spleen, pancreas, gallbladder and adrenal glands are unremarkable.  There is no biliary dilation or ascites.  The stomach is decompressed.  The portal vein, splenic vein, SMV, celiac axis and SMA all are patent.  No significant abdominal lymphadenopathy.    The kidneys enhance symmetrically without hydronephrosis or nephrolithiasis.  There is mild bilateral perinephric fat stranding.  Several low attenuating lesions arise from the kidneys bilaterally, the majority of which are too small for characterization, largest within the interpolar region of the left kidney measures 3 cm, attenuation of which is higher than would be expected for a simple cyst although attenuation measurement is limited given its location.  The bilateral ureters are unremarkable without calculi seen.  The urinary bladder is distended without wall thickening.  The prostate is not enlarged.    The distal large bowel is decompressed.  There are a few scattered colonic diverticula without inflammation.  The terminal ileum and appendix are unremarkable.  The small bowel is  "unremarkable.  Several scattered shotty periaortic and paracaval lymph nodes are noted.  There is atherosclerotic calcification of the aorta and its branches.    No acute osseous destructive process.  Degenerative changes are noted of the spine.  There is mild left inguinal lymphadenopathy.  There is soft tissue induration throughout the subcutaneous fat involving the proximal left medial and posterior thigh.  Along the medial aspect of the thigh, there are 2 punctate foci of subcutaneous emphysema in the region of induration however no discrete focal organized fluid collection.  Edema tracks along the muscular fascial planes of the proximal left thigh.  There is overlying skin thickening.                                  Assessment/Plan:     * Abscess of left thigh  CT today with "Findings suggesting cellulitis involving the posteromedial proximal thigh soft tissues, with subcutaneous strand-like edema, extending to the fascial surface.  There is punctate subcutaneous emphysema within the superficial subcutaneous fat, adjacent to a small amount of disorganized fluid.  No discrete formed abscess at this time, however developing abscess is a consideration versus previously drained abscess."  -meets criteria for sepsis for WBC and tachycardia  -Started on Rocephin/Doxy/Flagyl as CKD and received IV contrast  -Surgery consulted  -Blood and wound cultures pending    Sepsis  Meets criteria for sepsis based on WBC and tachycardia. Started on antibiotics with blood and wound cultures pending    Elevated serum creatinine  Cr of 2.1 on arrival. Last to compare in December of last year 2.6. Unclear as to baseline. Started on IVF as received IV contrast. Retroperitoneal US and UA pending. Repeat renal function in the morning.     Diabetes mellitus type 1  On 14 units of levemir nightly and novolog sliding scale at home. Will start 5 units basal and sliding scale. Diabetic diet. Goal -180  No results found for: LABA1C, " HGBA1C    HLD (hyperlipidemia)  Continue home statin    Essential hypertension  Fair control. Holding home losartan for elevated creatinine and received IV contrast. Holding losartan. PRN hydralazine      VTE Risk Mitigation (From admission, onward)         Ordered     Place CHRIS hose  Until discontinued      07/24/20 1843     IP VTE HIGH RISK PATIENT  Once      07/24/20 1801     Place sequential compression device  Until discontinued      07/24/20 1801              VTE: CHRIS/SCD  Code: Full  Diet: diabetic  Dispo: pending surgery eval and antibiotics  Patient will be placed in observation with hospital medicine.       Mark Morales PA-C  Department of Hospital Medicine   [facility]

## 2020-07-24 NOTE — ED TRIAGE NOTES
Pt presents to the ED via personal transportation reporting an abscess to his groin area x 2 weeks. Pt denies any fevers or chills at this time. Pt also reports he is currently a diabetic. Pt also c/o pain to the area at this time.

## 2020-07-24 NOTE — ASSESSMENT & PLAN NOTE
On 14 units of levemir nightly and novolog sliding scale at home. Will start 5 units basal and sliding scale. Diabetic diet. Goal -180  No results found for: LABA1C, HGBA1C

## 2020-07-24 NOTE — SUBJECTIVE & OBJECTIVE
Past Medical History:   Diagnosis Date    Diabetes mellitus     Hypertension        History reviewed. No pertinent surgical history.    Review of patient's allergies indicates:  No Known Allergies    No current facility-administered medications on file prior to encounter.      Current Outpatient Medications on File Prior to Encounter   Medication Sig    insulin aspart U-100 (NOVOLOG) 100 unit/mL injection Inject into the skin 3 (three) times daily before meals.    UNKNOWN TO PATIENT     UNKNOWN TO PATIENT      Family History     None        Tobacco Use    Smoking status: Current Every Day Smoker    Smokeless tobacco: Never Used   Substance and Sexual Activity    Alcohol use: Yes    Drug use: Never    Sexual activity: Not on file     Review of Systems   Constitutional: Negative for chills and fever.   HENT: Negative for nosebleeds and tinnitus.    Eyes: Negative for photophobia and visual disturbance.   Respiratory: Negative for shortness of breath and wheezing.    Cardiovascular: Negative for chest pain, palpitations and leg swelling.   Gastrointestinal: Negative for abdominal distention, nausea and vomiting.   Genitourinary: Negative for dysuria, flank pain and hematuria.   Musculoskeletal: Negative for gait problem and joint swelling.   Skin: Positive for color change, rash and wound.   Neurological: Negative for seizures and syncope.     Objective:     Vital Signs (Most Recent):  Temp: 98.1 °F (36.7 °C) (07/24/20 1622)  Pulse: 96 (07/24/20 1622)  Resp: 18 (07/24/20 1758)  BP: 133/79 (07/24/20 1622)  SpO2: 99 % (07/24/20 1622) Vital Signs (24h Range):  Temp:  [98.1 °F (36.7 °C)-99.7 °F (37.6 °C)] 98.1 °F (36.7 °C)  Pulse:  [] 96  Resp:  [18] 18  SpO2:  [99 %-100 %] 99 %  BP: (133-168)/(79-86) 133/79     Weight: 102.1 kg (225 lb)  Body mass index is 28.89 kg/m².    Physical Exam  Vitals signs and nursing note reviewed.   Constitutional:       General: He is not in acute distress.     Appearance: He  is well-developed.   HENT:      Head: Normocephalic and atraumatic.      Right Ear: External ear normal.      Left Ear: External ear normal.   Eyes:      General:         Right eye: No discharge.         Left eye: No discharge.      Conjunctiva/sclera: Conjunctivae normal.   Neck:      Musculoskeletal: Normal range of motion.      Thyroid: No thyromegaly.   Cardiovascular:      Rate and Rhythm: Normal rate and regular rhythm.      Heart sounds: No murmur.   Pulmonary:      Effort: Pulmonary effort is normal. No respiratory distress.      Breath sounds: Normal breath sounds.   Abdominal:      General: Bowel sounds are normal. There is no distension.      Palpations: Abdomen is soft. There is no mass.      Tenderness: There is no abdominal tenderness.   Musculoskeletal:         General: No deformity.      Comments: Area of indurance to left medial thigh with active bloody drainage. Being I&D by ED staff. Does not appear to involved rectum. Janae Sawyer PA-C and Dr. Puga of ED at bedside during exam   Skin:     General: Skin is warm and dry.   Neurological:      Mental Status: He is alert and oriented to person, place, and time.   Psychiatric:         Behavior: Behavior normal.             Significant Labs:   CBC:   Recent Labs   Lab 07/24/20  1302   WBC 22.45*   HGB 11.6*   HCT 34.0*        CMP:   Recent Labs   Lab 07/24/20  1536   *   K 3.4*      CO2 21*   *   BUN 33*   CREATININE 2.1*   CALCIUM 8.5*   PROT 6.4   ALBUMIN 1.6*   BILITOT 0.3   ALKPHOS 106   AST 12   ALT 6*   ANIONGAP 9   EGFRNONAA 40*     Lactic Acid:   Recent Labs   Lab 07/24/20  1302   LACTATE 1.5       Significant Imaging:   Imaging Results           CT Abdomen Pelvis With Contrast (Final result)  Result time 07/24/20 16:53:55    Final result by Gilles Cifuentes MD (07/24/20 16:53:55)                 Impression:      This report was flagged in Epic as abnormal.    1. Findings suggesting cellulitis involving the  posteromedial proximal thigh soft tissues, with subcutaneous strand-like edema, extending to the fascial surface.  There is punctate subcutaneous emphysema within the superficial subcutaneous fat, adjacent to a small amount of disorganized fluid.  No discrete formed abscess at this time, however developing abscess is a consideration versus previously drained abscess.  Correlation is advised.  2. Right lower lobe pulmonary nodule.  Comparison with any previous examinations would be helpful.  For a solid nodule >8 mm, Fleischner Society 2017 guidelines recommend considering CT, PET/CT or tissue sampling at 3 months.  3. Probable left renal cyst however nonemergent ultrasound is recommended to confirm cystic nature.  4. Findings suggesting hepatic steatosis, correlation with LFTs recommended.  5. Mild bilateral perinephric fat stranding, nonspecific, correlation with urinalysis advised.  6. Several additional findings above.      Electronically signed by: Gilles Cifuentes MD  Date:    07/24/2020  Time:    16:53             Narrative:    EXAMINATION:  CT ABDOMEN PELVIS WITH CONTRAST    CLINICAL HISTORY:  left inner thigh abscess;    TECHNIQUE:  Low dose axial images, sagittal and coronal reformations were obtained from the lung bases to the pubic symphysis following the IV administration of 100 mL of Omnipaque 350 .  Oral contrast was not given.    COMPARISON:  None.    FINDINGS:  Images of the lower thorax are remarkable for a pulmonary nodule within the right lower lobe measuring 0.9-1 cm.    The liver is hypoattenuating, suggesting steatosis, correlation with LFTs recommended.  The spleen, pancreas, gallbladder and adrenal glands are unremarkable.  There is no biliary dilation or ascites.  The stomach is decompressed.  The portal vein, splenic vein, SMV, celiac axis and SMA all are patent.  No significant abdominal lymphadenopathy.    The kidneys enhance symmetrically without hydronephrosis or nephrolithiasis.  There  is mild bilateral perinephric fat stranding.  Several low attenuating lesions arise from the kidneys bilaterally, the majority of which are too small for characterization, largest within the interpolar region of the left kidney measures 3 cm, attenuation of which is higher than would be expected for a simple cyst although attenuation measurement is limited given its location.  The bilateral ureters are unremarkable without calculi seen.  The urinary bladder is distended without wall thickening.  The prostate is not enlarged.    The distal large bowel is decompressed.  There are a few scattered colonic diverticula without inflammation.  The terminal ileum and appendix are unremarkable.  The small bowel is unremarkable.  Several scattered shotty periaortic and paracaval lymph nodes are noted.  There is atherosclerotic calcification of the aorta and its branches.    No acute osseous destructive process.  Degenerative changes are noted of the spine.  There is mild left inguinal lymphadenopathy.  There is soft tissue induration throughout the subcutaneous fat involving the proximal left medial and posterior thigh.  Along the medial aspect of the thigh, there are 2 punctate foci of subcutaneous emphysema in the region of induration however no discrete focal organized fluid collection.  Edema tracks along the muscular fascial planes of the proximal left thigh.  There is overlying skin thickening.

## 2020-07-25 ENCOUNTER — ANESTHESIA EVENT (OUTPATIENT)
Dept: SURGERY | Facility: HOSPITAL | Age: 35
DRG: 853 | End: 2020-07-25
Payer: MEDICAID

## 2020-07-25 ENCOUNTER — ANESTHESIA (OUTPATIENT)
Dept: SURGERY | Facility: HOSPITAL | Age: 35
DRG: 853 | End: 2020-07-25
Payer: MEDICAID

## 2020-07-25 PROBLEM — N18.9 ANEMIA DUE TO CHRONIC KIDNEY DISEASE: Status: ACTIVE | Noted: 2020-07-25

## 2020-07-25 PROBLEM — N17.9 AKI (ACUTE KIDNEY INJURY): Status: ACTIVE | Noted: 2020-07-24

## 2020-07-25 PROBLEM — D63.1 ANEMIA DUE TO CHRONIC KIDNEY DISEASE: Status: ACTIVE | Noted: 2020-07-25

## 2020-07-25 LAB
ALBUMIN SERPL BCP-MCNC: 1.4 G/DL (ref 3.5–5.2)
ALBUMIN SERPL BCP-MCNC: 1.5 G/DL (ref 3.5–5.2)
ALP SERPL-CCNC: 156 U/L (ref 55–135)
ALT SERPL W/O P-5'-P-CCNC: 9 U/L (ref 10–44)
AMORPH CRY URNS QL MICRO: ABNORMAL
AMORPH CRY URNS QL MICRO: ABNORMAL
ANION GAP SERPL CALC-SCNC: 10 MMOL/L (ref 8–16)
ANION GAP SERPL CALC-SCNC: 9 MMOL/L (ref 8–16)
AST SERPL-CCNC: 10 U/L (ref 10–40)
BACTERIA #/AREA URNS HPF: ABNORMAL /HPF
BACTERIA #/AREA URNS HPF: ABNORMAL /HPF
BASOPHILS NFR BLD: 0 % (ref 0–1.9)
BILIRUB SERPL-MCNC: 0.2 MG/DL (ref 0.1–1)
BILIRUB UR QL STRIP: NEGATIVE
BILIRUB UR QL STRIP: NEGATIVE
BUN SERPL-MCNC: 37 MG/DL (ref 6–20)
BUN SERPL-MCNC: 44 MG/DL (ref 6–20)
CALCIUM SERPL-MCNC: 7.9 MG/DL (ref 8.7–10.5)
CALCIUM SERPL-MCNC: 8.2 MG/DL (ref 8.7–10.5)
CHLORIDE SERPL-SCNC: 99 MMOL/L (ref 95–110)
CHLORIDE SERPL-SCNC: 99 MMOL/L (ref 95–110)
CHOLEST SERPL-MCNC: 62 MG/DL (ref 120–199)
CHOLEST/HDLC SERPL: 2.4 {RATIO} (ref 2–5)
CLARITY UR: ABNORMAL
CLARITY UR: ABNORMAL
CO2 SERPL-SCNC: 19 MMOL/L (ref 23–29)
CO2 SERPL-SCNC: 19 MMOL/L (ref 23–29)
COLOR UR: YELLOW
COLOR UR: YELLOW
CREAT SERPL-MCNC: 3.4 MG/DL (ref 0.5–1.4)
CREAT SERPL-MCNC: 5 MG/DL (ref 0.5–1.4)
CREAT UR-MCNC: 75.6 MG/DL (ref 23–375)
DIFFERENTIAL METHOD: ABNORMAL
EOSINOPHIL NFR BLD: 0 % (ref 0–8)
ERYTHROCYTE [DISTWIDTH] IN BLOOD BY AUTOMATED COUNT: 12.4 % (ref 11.5–14.5)
EST. GFR  (AFRICAN AMERICAN): 16 ML/MIN/1.73 M^2
EST. GFR  (AFRICAN AMERICAN): 26 ML/MIN/1.73 M^2
EST. GFR  (NON AFRICAN AMERICAN): 14 ML/MIN/1.73 M^2
EST. GFR  (NON AFRICAN AMERICAN): 22 ML/MIN/1.73 M^2
ESTIMATED AVG GLUCOSE: 309 MG/DL (ref 68–131)
GLUCOSE SERPL-MCNC: 198 MG/DL (ref 70–110)
GLUCOSE SERPL-MCNC: 249 MG/DL (ref 70–110)
GLUCOSE UR QL STRIP: ABNORMAL
GLUCOSE UR QL STRIP: ABNORMAL
GRAN CASTS #/AREA URNS LPF: 1 /LPF
GRAN CASTS #/AREA URNS LPF: 1 /LPF
HBA1C MFR BLD HPLC: 12.4 % (ref 4–5.6)
HCT VFR BLD AUTO: 28.7 % (ref 40–54)
HDLC SERPL-MCNC: 26 MG/DL (ref 40–75)
HDLC SERPL: 41.9 % (ref 20–50)
HGB BLD-MCNC: 9.8 G/DL (ref 14–18)
HGB UR QL STRIP: ABNORMAL
HGB UR QL STRIP: ABNORMAL
HYALINE CASTS #/AREA URNS LPF: 0 /LPF
HYALINE CASTS #/AREA URNS LPF: 6 /LPF
IMM GRANULOCYTES # BLD AUTO: ABNORMAL K/UL (ref 0–0.04)
IMM GRANULOCYTES NFR BLD AUTO: ABNORMAL % (ref 0–0.5)
KETONES UR QL STRIP: NEGATIVE
KETONES UR QL STRIP: NEGATIVE
LDLC SERPL CALC-MCNC: 16.4 MG/DL (ref 63–159)
LEUKOCYTE ESTERASE UR QL STRIP: NEGATIVE
LEUKOCYTE ESTERASE UR QL STRIP: NEGATIVE
LYMPHOCYTES NFR BLD: 15 % (ref 18–48)
MAGNESIUM SERPL-MCNC: 1.4 MG/DL (ref 1.6–2.6)
MCH RBC QN AUTO: 28.7 PG (ref 27–31)
MCHC RBC AUTO-ENTMCNC: 34.1 G/DL (ref 32–36)
MCV RBC AUTO: 84 FL (ref 82–98)
METAMYELOCYTES NFR BLD MANUAL: 1 %
MICROSCOPIC COMMENT: ABNORMAL
MICROSCOPIC COMMENT: ABNORMAL
MONOCYTES NFR BLD: 5 % (ref 4–15)
NEUTROPHILS NFR BLD: 70 % (ref 38–73)
NEUTS BAND NFR BLD MANUAL: 9 %
NITRITE UR QL STRIP: NEGATIVE
NITRITE UR QL STRIP: NEGATIVE
NON-SQ EPI CELLS #/AREA URNS HPF: 1 /HPF
NON-SQ EPI CELLS #/AREA URNS HPF: 2 /HPF
NONHDLC SERPL-MCNC: 36 MG/DL
NRBC BLD-RTO: 0 /100 WBC
PH UR STRIP: 5 [PH] (ref 5–8)
PH UR STRIP: 6 [PH] (ref 5–8)
PHOSPHATE SERPL-MCNC: 5.3 MG/DL (ref 2.7–4.5)
PLATELET # BLD AUTO: 194 K/UL (ref 150–350)
PMV BLD AUTO: 11.7 FL (ref 9.2–12.9)
POCT GLUCOSE: 229 MG/DL (ref 70–110)
POCT GLUCOSE: 246 MG/DL (ref 70–110)
POCT GLUCOSE: 279 MG/DL (ref 70–110)
POCT GLUCOSE: 279 MG/DL (ref 70–110)
POCT GLUCOSE: 301 MG/DL (ref 70–110)
POTASSIUM SERPL-SCNC: 3.4 MMOL/L (ref 3.5–5.1)
POTASSIUM SERPL-SCNC: 4.2 MMOL/L (ref 3.5–5.1)
PROT SERPL-MCNC: 5.8 G/DL (ref 6–8.4)
PROT UR QL STRIP: ABNORMAL
PROT UR QL STRIP: ABNORMAL
PROT UR-MCNC: 773 MG/DL
PROT/CREAT UR: 10.22 MG/G{CREAT} (ref 0–0.2)
RBC # BLD AUTO: 3.41 M/UL (ref 4.6–6.2)
RBC #/AREA URNS HPF: 1 /HPF (ref 0–4)
RBC #/AREA URNS HPF: 3 /HPF (ref 0–4)
SODIUM SERPL-SCNC: 127 MMOL/L (ref 136–145)
SODIUM SERPL-SCNC: 128 MMOL/L (ref 136–145)
SP GR UR STRIP: 1.02 (ref 1–1.03)
SP GR UR STRIP: 1.02 (ref 1–1.03)
SQUAMOUS #/AREA URNS HPF: 3 /HPF
TRIGL SERPL-MCNC: 98 MG/DL (ref 30–150)
URN SPEC COLLECT METH UR: ABNORMAL
URN SPEC COLLECT METH UR: ABNORMAL
UROBILINOGEN UR STRIP-ACNC: NEGATIVE EU/DL
UROBILINOGEN UR STRIP-ACNC: NEGATIVE EU/DL
WBC # BLD AUTO: 22 K/UL (ref 3.9–12.7)
WBC #/AREA URNS HPF: 12 /HPF (ref 0–5)
WBC #/AREA URNS HPF: 7 /HPF (ref 0–5)

## 2020-07-25 PROCEDURE — 25000003 PHARM REV CODE 250: Performed by: NURSE ANESTHETIST, CERTIFIED REGISTERED

## 2020-07-25 PROCEDURE — D9220A PRA ANESTHESIA: ICD-10-PCS | Mod: CRNA,,, | Performed by: NURSE ANESTHETIST, CERTIFIED REGISTERED

## 2020-07-25 PROCEDURE — 63600175 PHARM REV CODE 636 W HCPCS: Performed by: NURSE ANESTHETIST, CERTIFIED REGISTERED

## 2020-07-25 PROCEDURE — 84300 ASSAY OF URINE SODIUM: CPT

## 2020-07-25 PROCEDURE — 25000003 PHARM REV CODE 250: Performed by: SURGERY

## 2020-07-25 PROCEDURE — 63600175 PHARM REV CODE 636 W HCPCS: Performed by: SURGERY

## 2020-07-25 PROCEDURE — S0030 INJECTION, METRONIDAZOLE: HCPCS | Performed by: PHYSICIAN ASSISTANT

## 2020-07-25 PROCEDURE — 87205 SMEAR GRAM STAIN: CPT

## 2020-07-25 PROCEDURE — 82306 VITAMIN D 25 HYDROXY: CPT

## 2020-07-25 PROCEDURE — 85007 BL SMEAR W/DIFF WBC COUNT: CPT

## 2020-07-25 PROCEDURE — 11000001 HC ACUTE MED/SURG PRIVATE ROOM

## 2020-07-25 PROCEDURE — 25000003 PHARM REV CODE 250: Performed by: NURSE PRACTITIONER

## 2020-07-25 PROCEDURE — 63600175 PHARM REV CODE 636 W HCPCS: Performed by: NURSE PRACTITIONER

## 2020-07-25 PROCEDURE — 25000003 PHARM REV CODE 250: Performed by: PHYSICIAN ASSISTANT

## 2020-07-25 PROCEDURE — 88304 TISSUE EXAM BY PATHOLOGIST: CPT | Mod: 26,,, | Performed by: PATHOLOGY

## 2020-07-25 PROCEDURE — 87147 CULTURE TYPE IMMUNOLOGIC: CPT

## 2020-07-25 PROCEDURE — 83970 ASSAY OF PARATHORMONE: CPT

## 2020-07-25 PROCEDURE — 87075 CULTR BACTERIA EXCEPT BLOOD: CPT

## 2020-07-25 PROCEDURE — 96372 THER/PROPH/DIAG INJ SC/IM: CPT

## 2020-07-25 PROCEDURE — 63600175 PHARM REV CODE 636 W HCPCS: Performed by: ANESTHESIOLOGY

## 2020-07-25 PROCEDURE — 85027 COMPLETE CBC AUTOMATED: CPT

## 2020-07-25 PROCEDURE — 80069 RENAL FUNCTION PANEL: CPT

## 2020-07-25 PROCEDURE — 96376 TX/PRO/DX INJ SAME DRUG ADON: CPT

## 2020-07-25 PROCEDURE — 80053 COMPREHEN METABOLIC PANEL: CPT

## 2020-07-25 PROCEDURE — 37000009 HC ANESTHESIA EA ADD 15 MINS: Performed by: SURGERY

## 2020-07-25 PROCEDURE — 87077 CULTURE AEROBIC IDENTIFY: CPT

## 2020-07-25 PROCEDURE — 71000033 HC RECOVERY, INTIAL HOUR: Performed by: SURGERY

## 2020-07-25 PROCEDURE — 36000707: Performed by: SURGERY

## 2020-07-25 PROCEDURE — 81000 URINALYSIS NONAUTO W/SCOPE: CPT | Mod: 91

## 2020-07-25 PROCEDURE — D9220A PRA ANESTHESIA: ICD-10-PCS | Mod: ANES,,, | Performed by: ANESTHESIOLOGY

## 2020-07-25 PROCEDURE — D9220A PRA ANESTHESIA: Mod: CRNA,,, | Performed by: NURSE ANESTHETIST, CERTIFIED REGISTERED

## 2020-07-25 PROCEDURE — 88304 PR  SURG PATH,LEVEL III: ICD-10-PCS | Mod: 26,,, | Performed by: PATHOLOGY

## 2020-07-25 PROCEDURE — 37000008 HC ANESTHESIA 1ST 15 MINUTES: Performed by: SURGERY

## 2020-07-25 PROCEDURE — 36000706: Performed by: SURGERY

## 2020-07-25 PROCEDURE — 82570 ASSAY OF URINE CREATININE: CPT

## 2020-07-25 PROCEDURE — 87186 SC STD MICRODIL/AGAR DIL: CPT

## 2020-07-25 PROCEDURE — 80061 LIPID PANEL: CPT

## 2020-07-25 PROCEDURE — 96375 TX/PRO/DX INJ NEW DRUG ADDON: CPT

## 2020-07-25 PROCEDURE — 87086 URINE CULTURE/COLONY COUNT: CPT

## 2020-07-25 PROCEDURE — 71000039 HC RECOVERY, EACH ADD'L HOUR: Performed by: SURGERY

## 2020-07-25 PROCEDURE — 83735 ASSAY OF MAGNESIUM: CPT

## 2020-07-25 PROCEDURE — 36415 COLL VENOUS BLD VENIPUNCTURE: CPT

## 2020-07-25 PROCEDURE — D9220A PRA ANESTHESIA: Mod: ANES,,, | Performed by: ANESTHESIOLOGY

## 2020-07-25 PROCEDURE — 87070 CULTURE OTHR SPECIMN AEROBIC: CPT

## 2020-07-25 PROCEDURE — 81000 URINALYSIS NONAUTO W/SCOPE: CPT

## 2020-07-25 PROCEDURE — 88304 TISSUE EXAM BY PATHOLOGIST: CPT | Performed by: PATHOLOGY

## 2020-07-25 RX ORDER — SUCCINYLCHOLINE CHLORIDE 20 MG/ML
INJECTION INTRAMUSCULAR; INTRAVENOUS
Status: DISCONTINUED | OUTPATIENT
Start: 2020-07-25 | End: 2020-07-25

## 2020-07-25 RX ORDER — MEROPENEM AND SODIUM CHLORIDE 1 G/50ML
1 INJECTION, SOLUTION INTRAVENOUS
Status: DISCONTINUED | OUTPATIENT
Start: 2020-07-25 | End: 2020-07-27

## 2020-07-25 RX ORDER — POTASSIUM CHLORIDE 20 MEQ/1
40 TABLET, EXTENDED RELEASE ORAL ONCE
Status: DISCONTINUED | OUTPATIENT
Start: 2020-07-25 | End: 2020-07-25

## 2020-07-25 RX ORDER — HYDROMORPHONE HYDROCHLORIDE 2 MG/ML
0.5 INJECTION, SOLUTION INTRAMUSCULAR; INTRAVENOUS; SUBCUTANEOUS ONCE
Status: COMPLETED | OUTPATIENT
Start: 2020-07-25 | End: 2020-07-25

## 2020-07-25 RX ORDER — ROCURONIUM BROMIDE 10 MG/ML
INJECTION, SOLUTION INTRAVENOUS
Status: DISCONTINUED | OUTPATIENT
Start: 2020-07-25 | End: 2020-07-25

## 2020-07-25 RX ORDER — PHENYLEPHRINE HCL IN 0.9% NACL 1 MG/10 ML
SYRINGE (ML) INTRAVENOUS
Status: DISCONTINUED | OUTPATIENT
Start: 2020-07-25 | End: 2020-07-25

## 2020-07-25 RX ORDER — ONDANSETRON 2 MG/ML
INJECTION INTRAMUSCULAR; INTRAVENOUS
Status: DISCONTINUED | OUTPATIENT
Start: 2020-07-25 | End: 2020-07-25

## 2020-07-25 RX ORDER — HYDROMORPHONE HYDROCHLORIDE 2 MG/ML
0.2 INJECTION, SOLUTION INTRAMUSCULAR; INTRAVENOUS; SUBCUTANEOUS EVERY 5 MIN PRN
Status: DISCONTINUED | OUTPATIENT
Start: 2020-07-25 | End: 2020-07-25 | Stop reason: HOSPADM

## 2020-07-25 RX ORDER — MIDAZOLAM HYDROCHLORIDE 1 MG/ML
INJECTION, SOLUTION INTRAMUSCULAR; INTRAVENOUS
Status: DISCONTINUED | OUTPATIENT
Start: 2020-07-25 | End: 2020-07-25

## 2020-07-25 RX ORDER — PROPOFOL 10 MG/ML
VIAL (ML) INTRAVENOUS
Status: DISCONTINUED | OUTPATIENT
Start: 2020-07-25 | End: 2020-07-25

## 2020-07-25 RX ORDER — LIDOCAINE HYDROCHLORIDE 20 MG/ML
INJECTION, SOLUTION EPIDURAL; INFILTRATION; INTRACAUDAL; PERINEURAL
Status: DISCONTINUED | OUTPATIENT
Start: 2020-07-25 | End: 2020-07-25

## 2020-07-25 RX ORDER — INSULIN ASPART 100 [IU]/ML
5 INJECTION, SOLUTION INTRAVENOUS; SUBCUTANEOUS
Status: DISCONTINUED | OUTPATIENT
Start: 2020-07-26 | End: 2020-07-26

## 2020-07-25 RX ORDER — SODIUM CHLORIDE 0.9 % (FLUSH) 0.9 %
10 SYRINGE (ML) INJECTION
Status: DISCONTINUED | OUTPATIENT
Start: 2020-07-25 | End: 2020-07-31 | Stop reason: HOSPADM

## 2020-07-25 RX ORDER — POTASSIUM CHLORIDE 7.45 MG/ML
10 INJECTION INTRAVENOUS
Status: DISCONTINUED | OUTPATIENT
Start: 2020-07-25 | End: 2020-07-25

## 2020-07-25 RX ORDER — SODIUM CHLORIDE, SODIUM LACTATE, POTASSIUM CHLORIDE, CALCIUM CHLORIDE 600; 310; 30; 20 MG/100ML; MG/100ML; MG/100ML; MG/100ML
INJECTION, SOLUTION INTRAVENOUS CONTINUOUS PRN
Status: DISCONTINUED | OUTPATIENT
Start: 2020-07-25 | End: 2020-07-25

## 2020-07-25 RX ORDER — FENTANYL CITRATE 50 UG/ML
INJECTION, SOLUTION INTRAMUSCULAR; INTRAVENOUS
Status: DISCONTINUED | OUTPATIENT
Start: 2020-07-25 | End: 2020-07-25

## 2020-07-25 RX ADMIN — DOXYCYCLINE 100 MG: 100 INJECTION, POWDER, LYOPHILIZED, FOR SOLUTION INTRAVENOUS at 07:07

## 2020-07-25 RX ADMIN — FENTANYL CITRATE 50 MCG: 50 INJECTION, SOLUTION INTRAMUSCULAR; INTRAVENOUS at 01:07

## 2020-07-25 RX ADMIN — LIDOCAINE HYDROCHLORIDE 100 MG: 20 INJECTION, SOLUTION EPIDURAL; INFILTRATION; INTRACAUDAL; PERINEURAL at 01:07

## 2020-07-25 RX ADMIN — OXYCODONE HYDROCHLORIDE AND ACETAMINOPHEN 1 TABLET: 5; 325 TABLET ORAL at 12:07

## 2020-07-25 RX ADMIN — METRONIDAZOLE 500 MG: 500 INJECTION, SOLUTION INTRAVENOUS at 11:07

## 2020-07-25 RX ADMIN — SODIUM CHLORIDE, SODIUM LACTATE, POTASSIUM CHLORIDE, AND CALCIUM CHLORIDE: .6; .31; .03; .02 INJECTION, SOLUTION INTRAVENOUS at 12:07

## 2020-07-25 RX ADMIN — VANCOMYCIN HYDROCHLORIDE 2000 MG: 500 INJECTION, POWDER, LYOPHILIZED, FOR SOLUTION INTRAVENOUS at 02:07

## 2020-07-25 RX ADMIN — HYDROMORPHONE HYDROCHLORIDE 0.2 MG: 2 INJECTION, SOLUTION INTRAMUSCULAR; INTRAVENOUS; SUBCUTANEOUS at 02:07

## 2020-07-25 RX ADMIN — OXYCODONE HYDROCHLORIDE AND ACETAMINOPHEN 1 TABLET: 5; 325 TABLET ORAL at 05:07

## 2020-07-25 RX ADMIN — HYDROMORPHONE HYDROCHLORIDE 0.5 MG: 2 INJECTION, SOLUTION INTRAMUSCULAR; INTRAVENOUS; SUBCUTANEOUS at 10:07

## 2020-07-25 RX ADMIN — INSULIN ASPART 2 UNITS: 100 INJECTION, SOLUTION INTRAVENOUS; SUBCUTANEOUS at 06:07

## 2020-07-25 RX ADMIN — POTASSIUM CHLORIDE 10 MEQ: 7.46 INJECTION, SOLUTION INTRAVENOUS at 10:07

## 2020-07-25 RX ADMIN — ATORVASTATIN CALCIUM 20 MG: 10 TABLET, FILM COATED ORAL at 08:07

## 2020-07-25 RX ADMIN — MIDAZOLAM HYDROCHLORIDE 2 MG: 1 INJECTION, SOLUTION INTRAMUSCULAR; INTRAVENOUS at 12:07

## 2020-07-25 RX ADMIN — PROPOFOL 80 MG: 10 INJECTION, EMULSION INTRAVENOUS at 01:07

## 2020-07-25 RX ADMIN — INSULIN ASPART 1 UNITS: 100 INJECTION, SOLUTION INTRAVENOUS; SUBCUTANEOUS at 09:07

## 2020-07-25 RX ADMIN — SODIUM CHLORIDE: 0.9 INJECTION, SOLUTION INTRAVENOUS at 11:07

## 2020-07-25 RX ADMIN — SUCCINYLCHOLINE CHLORIDE 80 MG: 20 INJECTION, SOLUTION INTRAMUSCULAR; INTRAVENOUS; PARENTERAL at 01:07

## 2020-07-25 RX ADMIN — PROPOFOL 50 MG: 10 INJECTION, EMULSION INTRAVENOUS at 01:07

## 2020-07-25 RX ADMIN — MEROPENEM AND SODIUM CHLORIDE 1 G: 1 INJECTION, SOLUTION INTRAVENOUS at 12:07

## 2020-07-25 RX ADMIN — PROPOFOL 100 MG: 10 INJECTION, EMULSION INTRAVENOUS at 01:07

## 2020-07-25 RX ADMIN — INSULIN ASPART 4 UNITS: 100 INJECTION, SOLUTION INTRAVENOUS; SUBCUTANEOUS at 03:07

## 2020-07-25 RX ADMIN — ROCURONIUM BROMIDE 5 MG: 50 INJECTION INTRAVENOUS at 01:07

## 2020-07-25 RX ADMIN — OXYCODONE HYDROCHLORIDE AND ACETAMINOPHEN 1 TABLET: 5; 325 TABLET ORAL at 08:07

## 2020-07-25 RX ADMIN — SUCCINYLCHOLINE CHLORIDE 160 MG: 20 INJECTION, SOLUTION INTRAMUSCULAR; INTRAVENOUS; PARENTERAL at 01:07

## 2020-07-25 RX ADMIN — MEROPENEM AND SODIUM CHLORIDE 1 G: 1 INJECTION, SOLUTION INTRAVENOUS at 09:07

## 2020-07-25 RX ADMIN — ONDANSETRON HYDROCHLORIDE 4 MG: 2 SOLUTION INTRAMUSCULAR; INTRAVENOUS at 01:07

## 2020-07-25 RX ADMIN — Medication 200 MCG: at 01:07

## 2020-07-25 RX ADMIN — METRONIDAZOLE 500 MG: 500 INJECTION, SOLUTION INTRAVENOUS at 04:07

## 2020-07-25 RX ADMIN — OXYCODONE HYDROCHLORIDE AND ACETAMINOPHEN 1 TABLET: 5; 325 TABLET ORAL at 04:07

## 2020-07-25 NOTE — PROGRESS NOTES
[facility] Hospital Medicine  Progress Note    Patient Name: Chas Henao  MRN: [id number]  Patient Class: OP- Observation   Admission Date: 7/24/2020  Length of Stay: 0 days  Attending Physician: Ada Perez MD  Primary Care Provider: Primary Doctor No        Subjective:     Principal Problem:Abscess of left thigh        HPI:  Chas Henao 35 y.o. male with type 1 diabetes, HTN, and HLD presents to the hospital with a chief complaint of leg pain. He reports 1 week ago he saw what he thought was a pimple to his left medial thigh that he attempted to express without success and the abscess expanded. Over the last week he has experienced an increasing constant throbbing pain without radiation improved with I&D of abscess in the ED and worsened with ambulation. He denies any trauma or injury to the leg. He has not lost control of his bowel/bladder. He denies fever, chest pain, SOB, N/V, abdominal pain, dysuria, dizziness, syncope.     In the ED, tachycardic to 105, WBC of 22, abscess seen on CT, lactic acid normal, Cr of 2.1.     Overview/Hospital Course:  Chas Henao 35 y.o. male placed in observation for left upper thigh abscess, NORAH and sepsis.  I&D in ED. Rocephin/Flagyl/doxy started on admission. Patient received CT with contrast on admission. General surgery consult. sCr 2.1>3.4 today. US renal showed normal side kidneys, bilateral kidneys and bilateral elevated renal indices. Plan for OR for ID todabilateral elevated renal indicesy. Obtain UA/Pcr, nephrology consult.     Interval History: Left  inner thigh pain . Abscess draining serosang.     Review of Systems   Constitutional: Negative for chills and fever.   HENT: Negative for nosebleeds and tinnitus.    Eyes: Negative for photophobia and visual disturbance.   Respiratory: Negative for shortness of breath and wheezing.    Cardiovascular: Negative for chest pain, palpitations and leg swelling.   Gastrointestinal: Negative for  abdominal distention, nausea and vomiting.   Genitourinary: Negative for dysuria, flank pain and hematuria.   Musculoskeletal: Negative for gait problem and joint swelling.   Skin: Positive for color change, rash and wound.   Neurological: Negative for seizures and syncope.     Objective:     Vital Signs (Most Recent):  Temp: 99.4 °F (37.4 °C) (07/25/20 0709)  Pulse: 109 (07/25/20 0709)  Resp: 20 (07/25/20 0709)  BP: (!) 149/72 (07/25/20 0709)  SpO2: 98 % (07/25/20 0709) Vital Signs (24h Range):  Temp:  [98.1 °F (36.7 °C)-102.2 °F (39 °C)] 99.4 °F (37.4 °C)  Pulse:  [] 109  Resp:  [18-20] 20  SpO2:  [97 %-100 %] 98 %  BP: (116-168)/(63-86) 149/72     Weight: 102.1 kg (225 lb)  Body mass index is 28.89 kg/m².    Intake/Output Summary (Last 24 hours) at 7/25/2020 0959  Last data filed at 7/24/2020 1409  Gross per 24 hour   Intake 50 ml   Output --   Net 50 ml      Physical Exam  Vitals signs and nursing note reviewed.   Constitutional:       General: He is not in acute distress.     Appearance: He is well-developed.   HENT:      Head: Normocephalic and atraumatic.      Right Ear: External ear normal.      Left Ear: External ear normal.   Eyes:      General:         Right eye: No discharge.         Left eye: No discharge.      Conjunctiva/sclera: Conjunctivae normal.   Neck:      Musculoskeletal: Normal range of motion.      Thyroid: No thyromegaly.   Cardiovascular:      Rate and Rhythm: Normal rate and regular rhythm.      Heart sounds: No murmur.   Pulmonary:      Effort: Pulmonary effort is normal. No respiratory distress.      Breath sounds: Normal breath sounds.   Abdominal:      General: Bowel sounds are normal. There is no distension.      Palpations: Abdomen is soft. There is no mass.      Tenderness: There is no abdominal tenderness.   Musculoskeletal:         General: No deformity.   Skin:     General: Skin is warm and dry.      Comments: 7/23 Area of indurance to left medial thigh with active bloody  "drainage. Being I&D by ED staff. Does not appear to involved rectum. Janae Sawyer PA-C and Dr. Puga of ED at bedside during exam  7/24 2 open wounds draining serosan material . TTP. Patient ask for exam to be stop due to pain   Neurological:      Mental Status: He is alert and oriented to person, place, and time.   Psychiatric:         Behavior: Behavior normal.         Significant Labs: All pertinent labs within the past 24 hours have been reviewed.    Significant Imaging: I have reviewed and interpreted all pertinent imaging results/findings within the past 24 hours.      Assessment/Plan:      * Abscess of left thigh  CT today with "Findings suggesting cellulitis involving the posteromedial proximal thigh soft tissues, with subcutaneous strand-like edema, extending to the fascial surface.  There is punctate subcutaneous emphysema within the superficial subcutaneous fat, adjacent to a small amount of disorganized fluid.  No discrete formed abscess at this time, however developing abscess is a consideration versus previously drained abscess."  -meets criteria for sepsis for WBC and tachycardia  -Started on Rocephin/Doxy/Flagyl as CKD and received IV contrast  -Surgery consulted  -Blood and wound cultures pending    7/25/20  General surgery following  Follow up on Blood and wound culture   Switch abx to vanc and meropenem-pharmacy consult for close monitor-ID consult Monday    NORAH (acute kidney injury)  Cr of 2.1 on arrival. Last to compare in December of last year 2.6. Started on IVF as received IV contrast.   US renal showed bilateral elevated renal indices, bilateral renal cysts, and normal size kidney    7/25/20  Rising sCr 2.1>3.4    His albumin is 1.5!   Multifactorial -Did receive contrast IV with CT , sepsis, and acute fluctuation n blood pressure?  Reports chronic use of NSAID "all kinds" for pain   He is not aware of CKD, denies family members on dialysis  Last UA on file 2010 no protein  Obtaining " UA and UPCR given history of uncontrol BP/DM and albumin of 1.5   Continue IVF, avoid nephrotoxins  Add vit D, ipth, phos  Consult Nephrology                Essential hypertension  Uncontrolled at home -170's and last week 's but he thinks blood pressure machine broken.   Holding home losartan for elevated creatinine and received IV contrast. Holding losartan.  BPbetter here likely due to sepsis.   Continue hydralazine prn      Diabetes mellitus type 1  Dx age 22 . Paternal GM had DM  On 45units of levemir nightly and novolog 14 with meals at home.  He does not check in blood sugars at home but when he does it range 110-300's.He does not know his A1c  Increase basal 5 to 8  And add prandial 3/3/3 sliding scale.   Goal -180  HgbA1c pending.     Anemia due to chronic kidney disease  Hgb 11. 6 on admit  Down 9.8  Possible from ID IVF and sepesis contributing  No melena or hematochezia   Denies alcohol   Plt okay, T bili okay  Obtain iron studies, repeat hgb      Sepsis  WBC 22 stable from yesterday, Max temp 100.5 last night currenly 99.4  Tachycardia   Follow up wound an blood culture  Continue IVF   Switch abx to vanc and meropenem-pharmacy consult for close monitor-ID consult Monday    HLD (hyperlipidemia)  Obtain lipid panel  Continue home statin    VTE Risk Mitigation (From admission, onward)         Ordered     Place CHRIS hose  Until discontinued      07/24/20 1843     IP VTE HIGH RISK PATIENT  Once      07/24/20 1801     Place sequential compression device  Until discontinued      07/24/20 1801                      Zandra Hunt NP  Department of Hospital Medicine   [facility] [place]

## 2020-07-25 NOTE — NURSING
Pt. AAOx4. Febrile throughout night and tachy. Medications given per MD order. IV abx given through right AC 20g. CDI. Bed locked and low, Call light within reach, Will continue to monitor for patient safety.

## 2020-07-25 NOTE — PLAN OF CARE
S/P I and D. He is AAO x 4 and his vitals are back to pre-op baseline. Remains afebrile. Dressing to left inner thigh is dry and intact. Pain controlled and he denies nausea at this time. Recovery care is complete. Spoke with Gabby Gutierrez to dc IV potassium. Yelitza mayfield. Transferred to room 405 in stable condition.

## 2020-07-25 NOTE — ASSESSMENT & PLAN NOTE
Dx age 22 . Paternal GM had DM  On 45units of levemir nightly and novolog 14 with meals at home.  He does not check in blood sugars at home but when he does it range 110-300's.He does not know his A1c  Increase basal 5 to 8  And add prandial 3/3/3 sliding scale.   Goal -180  HgbA1c pending.

## 2020-07-25 NOTE — ASSESSMENT & PLAN NOTE
Uncontrolled at home -170's and last week 's but he thinks blood pressure machine broken.   Holding home losartan for elevated creatinine and received IV contrast. Holding losartan.  BPbetter here likely due to sepsis.   Continue hydralazine prn

## 2020-07-25 NOTE — ASSESSMENT & PLAN NOTE
WBC 22 stable from yesterday, Max temp 100.5 last night currenly 99.4  Tachycardia   Follow up wound an blood culture  Continue IVF   Will discuss with ID antibiotic

## 2020-07-25 NOTE — NURSING
Recieved from PACU via bed.Dressing with  Underwear clean and dry. States pain id at 7/10 prs. Refused analgesic until he eats. Report received prior transfered

## 2020-07-25 NOTE — ANESTHESIA PREPROCEDURE EVALUATION
07/25/2020  Chas Henao is a 35 y.o., male.    Anesthesia Evaluation          Review of Systems  Anesthesia Hx:  No previous Anesthesia   Social:  Non-Smoker    Hematology/Oncology:  Hematology Normal   Oncology Normal     EENT/Dental:EENT/Dental Normal   Cardiovascular:   Hypertension    Pulmonary:  Pulmonary Normal    Renal/:   Chronic Renal Disease, CRI    Hepatic/GI:  Hepatic/GI Normal    Musculoskeletal:  Musculoskeletal Normal    Neurological:  Neurology Normal    Endocrine:   Diabetes    Dermatological:  Skin Normal    Psych:  Psychiatric Normal           Physical Exam  General:  Well nourished    Airway/Jaw/Neck:  Airway Findings: Mallampati: II TM Distance: < 4 cm      Dental:  Dental Findings: (low left inc loose) Periodontal disease, Severe   Chest/Lungs:  Chest/Lungs Clear    Heart/Vascular:  Heart Findings: Normal       Mental Status:  Mental Status Findings:  Cooperative, Alert and Oriented         Anesthesia Plan  Type of Anesthesia, risks & benefits discussed:  Anesthesia Type:  general  Patient's Preference:   Intra-op Monitoring Plan: standard ASA monitors  Intra-op Monitoring Plan Comments:   Post Op Pain Control Plan: multimodal analgesia, IV/PO Opioids PRN and per primary service following discharge from PACU  Post Op Pain Control Plan Comments:   Induction:    Beta Blocker:  Patient is not currently on a Beta-Blocker (No further documentation required).       Informed Consent: Patient understands risks and agrees with Anesthesia plan.  Questions answered. Anesthesia consent signed with patient.  ASA Score: 3     Day of Surgery Review of History & Physical:    H&P update referred to the provider.  H&P completed by Anesthesiologist.   Anesthesia Plan Notes: Npo  Risk of tooth loss discussed with patient..understands..        Ready For Surgery From Anesthesia Perspective.

## 2020-07-25 NOTE — NURSING
Transport picked patient up in wheelchair and brought to US. Pt in stable condition. Will continue to monitor.

## 2020-07-25 NOTE — SUBJECTIVE & OBJECTIVE
Interval History: Left  inner thigh pain . Abscess draining serosang.     Review of Systems   Constitutional: Negative for chills and fever.   HENT: Negative for nosebleeds and tinnitus.    Eyes: Negative for photophobia and visual disturbance.   Respiratory: Negative for shortness of breath and wheezing.    Cardiovascular: Negative for chest pain, palpitations and leg swelling.   Gastrointestinal: Negative for abdominal distention, nausea and vomiting.   Genitourinary: Negative for dysuria, flank pain and hematuria.   Musculoskeletal: Negative for gait problem and joint swelling.   Skin: Positive for color change, rash and wound.   Neurological: Negative for seizures and syncope.     Objective:     Vital Signs (Most Recent):  Temp: 99.4 °F (37.4 °C) (07/25/20 0709)  Pulse: 109 (07/25/20 0709)  Resp: 20 (07/25/20 0709)  BP: (!) 149/72 (07/25/20 0709)  SpO2: 98 % (07/25/20 0709) Vital Signs (24h Range):  Temp:  [98.1 °F (36.7 °C)-102.2 °F (39 °C)] 99.4 °F (37.4 °C)  Pulse:  [] 109  Resp:  [18-20] 20  SpO2:  [97 %-100 %] 98 %  BP: (116-168)/(63-86) 149/72     Weight: 102.1 kg (225 lb)  Body mass index is 28.89 kg/m².    Intake/Output Summary (Last 24 hours) at 7/25/2020 0959  Last data filed at 7/24/2020 1409  Gross per 24 hour   Intake 50 ml   Output --   Net 50 ml      Physical Exam  Vitals signs and nursing note reviewed.   Constitutional:       General: He is not in acute distress.     Appearance: He is well-developed.   HENT:      Head: Normocephalic and atraumatic.      Right Ear: External ear normal.      Left Ear: External ear normal.   Eyes:      General:         Right eye: No discharge.         Left eye: No discharge.      Conjunctiva/sclera: Conjunctivae normal.   Neck:      Musculoskeletal: Normal range of motion.      Thyroid: No thyromegaly.   Cardiovascular:      Rate and Rhythm: Normal rate and regular rhythm.      Heart sounds: No murmur.   Pulmonary:      Effort: Pulmonary effort is normal. No  respiratory distress.      Breath sounds: Normal breath sounds.   Abdominal:      General: Bowel sounds are normal. There is no distension.      Palpations: Abdomen is soft. There is no mass.      Tenderness: There is no abdominal tenderness.   Musculoskeletal:         General: No deformity.   Skin:     General: Skin is warm and dry.      Comments: 7/23 Area of indurance to left medial thigh with active bloody drainage. Being I&D by ED staff. Does not appear to involved rectum. Janae Sawyer PA-C and Dr. Puga of ED at bedside during exam  7/24 2 open wounds draining serosan material . TTP. Patient ask for exam to be stop due to pain   Neurological:      Mental Status: He is alert and oriented to person, place, and time.   Psychiatric:         Behavior: Behavior normal.         Significant Labs: All pertinent labs within the past 24 hours have been reviewed.    Significant Imaging: I have reviewed and interpreted all pertinent imaging results/findings within the past 24 hours.

## 2020-07-25 NOTE — NURSING
Bladder scan as ordered. No urine noted .Awaiting another urine specimen for additional diagnostic tests. Clean urinal given for clean catch

## 2020-07-25 NOTE — CONSULTS
Surgery consultation    History of present [id number]-old male patient who reports a 1 week history of left upper thigh area.  He thought he had some folliculitis.  Over the last week has been expanding.  He reports his very painful.  He underwent I and D in the emergency room last night    Denies any trauma to the area.    Past medical history-hypertension, dyslipidemia, type 1 diabetes mellitus    Past surgical history-unremarkable    No known drug allergies    Physical examination    Chest-clear to auscultation    Abdomen soft nontender    Left upper thigh in the medial aspect.  Is a fluctuant area was tender around 10 x 12 cm of induration with some fluctuance in the middle    Laboratory data noted    CT scan reviewed which shows no gross abscess    Assessment left thigh abscess    Needs formal operative intervention with I and D in operating room    Plan while I and D today in the OR.  All discussed at length with patient

## 2020-07-25 NOTE — OP NOTE
Surgery-operative report    Preop diagnosis is left upper thigh abscess    Postop diagnosis in the same.  No evidence of extension into perineum    Procedure is I and D of skin and soft tissue with debridement of fascia.  No evidence of muscle necrosis; culture of area    Findings-skin and subcutaneous tissue with multiple inflammatory/abscess areas.  Some minimal fascia fibrin this changes with some purulence around the fascia fascia open with some edema and muscles but no evidence of muscle necrosis    Did not extend into perineum    Procedure in detail    After appropriate informed consent was signed patient taken operating stretcher operating table underwent general anesthesia endotracheal intubation and area was prepped and draped in normal fashion the patient are scheduled for antibiotics and will other Organization time-out been performed the area and the left medial upper thigh in the posterior was incised for about 10-12 cm the 2nd skin subcutaneous tissue the surrounding skin subcutaneous tissue necrotic areas were all removed with Bovie cauterization the file this down toward the muscle.  There was some fibrous exudate over the muscle with some slight purulence this was debrided.  At and opened up all this area was around 3-4 cm of the muscle was edematous but there was no evidence of the muscle necrosis.  The entire area was debrided it was around 15 x 8 cm.  The skin and subcutaneous tissue and underlying inflammatory areas had been debrided.  Other was cultured again with saline.  There was no extension toward the perineum.  There was no extension laterally.  There noted to be excellent hemostasis    The areas copiously irrigated with saline and packed with a Kerlix roll.  Sterile dressing was placed patient tolerated the procedure transferred recovery in stable condition

## 2020-07-25 NOTE — ASSESSMENT & PLAN NOTE
"Cr of 2.1 on arrival. Last to compare in December of last year 2.6. Started on IVF as received IV contrast.   US renal showed bilateral elevated renal indices, bilateral renal cysts, and normal size kidney    7/25/20  Rising sCr 2.1>3.4    His albumin is 1.5!   Multifactorial -Did receive contrast IV with CT , sepsis, and acute fluctuation n blood pressure?  Reports chronic use of NSAID "all kinds" for pain   He is not aware of CKD, denies family members on dialysis  Last UA on file 2010 no protein  Obtaining UA and UPCR given history of uncontrol BP/DM and albumin of 1.5   Continue IVF, avoid nephrotoxins  Add vit D, ipth, phos  Consult Nephrology              "

## 2020-07-25 NOTE — ASSESSMENT & PLAN NOTE
Hgb 11. 6 on admit  Down 9.8  Possible from ID IVF and sepesis contributing  No melena or hematochezia   Denies alcohol   Plt okay, T bili okay  Obtain iron studies, repeat hgb

## 2020-07-25 NOTE — ANESTHESIA PROCEDURE NOTES
Intubation  Performed by: Brittani Greenwood CRNA  Authorized by: Cornel Cano MD     Intubation:     Induction:  Intravenous    Intubated:  Postinduction    Mask Ventilation:  Easy with oral airway    Attempts:  1    Attempted By:  CRNA    Method of Intubation:  Direct    Blade:  Carvalho 2    Laryngeal View Grade: Grade I - full view of chords      Difficult Airway Encountered?: No      Complications:  None    Airway Device:  Oral endotracheal tube    Airway Device Size:  7.5    Style/Cuff Inflation:  Cuffed (inflated to minimal occlusive pressure)    Tube secured:  22    Secured at:  The lips    Placement Verified By:  Capnometry    Complicating Factors:  None    Findings Post-Intubation:  BS equal bilateral and atraumatic/condition of teeth unchanged

## 2020-07-25 NOTE — ASSESSMENT & PLAN NOTE
"CT today with "Findings suggesting cellulitis involving the posteromedial proximal thigh soft tissues, with subcutaneous strand-like edema, extending to the fascial surface.  There is punctate subcutaneous emphysema within the superficial subcutaneous fat, adjacent to a small amount of disorganized fluid.  No discrete formed abscess at this time, however developing abscess is a consideration versus previously drained abscess."  -meets criteria for sepsis for WBC and tachycardia  -Started on Rocephin/Doxy/Flagyl as CKD and received IV contrast  -Surgery consulted  -Blood and wound cultures pending    7/25/20  General surgery following  Follow up on Blood and wound culture   Will discuss with ID antibiotics  "

## 2020-07-25 NOTE — TRANSFER OF CARE
"Anesthesia Transfer of Care Note    Patient: Chas Henao    Procedure(s) Performed: Procedure(s) (LRB):  INCISION AND DRAINAGE (Left)    Patient location: PACU    Anesthesia Type: general    Transport from OR: Transported from OR on room air with adequate spontaneous ventilation    Post pain: adequate analgesia    Post assessment: no apparent anesthetic complications and tolerated procedure well    Post vital signs: stable    Level of consciousness: awake, alert and oriented    Nausea/Vomiting: no nausea/vomiting    Complications: none    Transfer of care protocol was followed      Last vitals:   Visit Vitals  BP (!) 141/82 (BP Location: Right arm, Patient Position: Lying)   Pulse 108   Temp 37 °C (98.6 °F) (Oral)   Resp 20   Ht 6' 2" (1.88 m)   Wt 102.1 kg (225 lb)   SpO2 99%   BMI 28.89 kg/m²     "

## 2020-07-25 NOTE — ASSESSMENT & PLAN NOTE
"-Patient admitted to hospital for left upper thigh abscess  -CT with "Findings suggesting cellulitis involving the posteromedial proximal thigh soft tissues, with subcutaneous strand-like edema, extending to the fascial surface.  There is punctate subcutaneous emphysema within the superficial subcutaneous fat, adjacent to a small amount of disorganized fluid.  No discrete formed abscess at this time, however developing abscess is a consideration versus previously drained abscess."  - on admit Started on Rocephin/Doxy/Flagyl as CKD and received IV contrast  - 7/25switch to Vanc and meropenem-Pharmacy following ; ID consult (will see MOnday)  - 7/25 I&D of left upper thigh showed some necrotizing fascitis that was minimal with viable muscle  - 7/26 afebrile and tachycardia improved. WBC 22 >26 however he looks better today. Surgery felt no further debridement at this time.   -7/24 Blood culture no grown so far  -7/24 wound culture and 7/5 wound culture from surgery in progress  -Continue Vanc and meropenem     "

## 2020-07-25 NOTE — PROGRESS NOTES
Pharmacokinetic Initial Assessment: IV Vancomycin    Assessment/Plan:    Initiate intravenous vancomycin with loading dose of 2000 mg once with subsequent doses when random concentrations are less than 20 mcg/mL  Desired empiric serum trough concentration is 10 to 20 mcg/mL  Draw vancomycin random level on 7/26 at 0400.  Pharmacy will continue to follow and monitor vancomycin.      Please contact pharmacy at extension [phone] with any questions regarding this assessment.     Thank you for the consult,   Tai PROSPER Marilee       Patient brief summary:  Chas Henao is a 35 y.o. male initiated on antimicrobial therapy with IV Vancomycin for treatment of suspected skin & soft tissue infection    Drug Allergies:   Review of patient's allergies indicates:  No Known Allergies    Actual Body Weight:   Wt Readings from Last 1 Encounters:   07/24/20 102.1 kg (225 lb)       Renal Function:   Estimated Creatinine Clearance: 38.7 mL/min (A) (based on SCr of 3.4 mg/dL (H)).,     Dialysis Method (if applicable):  N/A    CBC (last 72 hours):  Recent Labs   Lab Result Units 07/24/20  1302 07/25/20  0528   WBC K/uL 22.45* 22.00*   Hemoglobin g/dL 11.6* 9.8*   Hematocrit % 34.0* 28.7*   Platelets K/uL 222 194   Gran% % 83.5* 70.0   Lymph% % 7.6* 15.0*   Mono% % 7.0 5.0   Eosinophil% % 0.1 0.0   Basophil% % 0.2 0.0   Differential Method  Automated Manual       Metabolic Panel (last 72 hours):  Recent Labs   Lab Result Units 07/24/20  1536 07/25/20  0528   Sodium mmol/L 133* 128*   Potassium mmol/L 3.4* 3.4*   Chloride mmol/L 103 99   CO2 mmol/L 21* 19*   Glucose mg/dL 202* 249*   BUN, Bld mg/dL 33* 37*   Creatinine mg/dL 2.1* 3.4*   Albumin g/dL 1.6* 1.5*   Total Bilirubin mg/dL 0.3 0.2   Alkaline Phosphatase U/L 106 156*   AST U/L 12 10   ALT U/L 6* 9*   Magnesium mg/dL  --  1.4*       Drug levels (last 3 results):  No results for input(s): VANCOMYCINRA, VANCOMYCINPE, VANCOMYCINTR in the last 72 hours.    Microbiologic  Results:  Microbiology Results (last 7 days)       Procedure Component Value Units Date/Time    Blood Culture #1 **CANNOT BE ORDERED STAT** [[id number]] Collected: 07/24/20 1329    Order Status: Completed Specimen: Blood from Peripheral, Antecubital, Right Updated: 07/24/20 2112     Blood Culture, Routine No Growth to date    Blood Culture #2 **CANNOT BE ORDERED STAT** [[id number]] Collected: 07/24/20 1335    Order Status: Completed Specimen: Blood from Peripheral, Hand, Left Updated: 07/24/20 2112     Blood Culture, Routine No Growth to date    Aerobic culture (Specify Source) **CANNOT BE ORDERED AS STAT** [[id number]] Collected: 07/24/20 1609    Order Status: Sent Specimen: Abscess Updated: 07/24/20 1701

## 2020-07-25 NOTE — NURSING
Report received and patient care assumed. Cardiac monitoring in progress.#[id number] T 99.2 Constant pain to wound site. NPO at this time

## 2020-07-26 PROBLEM — Z72.0 TOBACCO USER: Status: ACTIVE | Noted: 2020-07-26

## 2020-07-26 PROBLEM — M72.6 NECROTIZING FASCIITIS: Status: ACTIVE | Noted: 2020-07-24

## 2020-07-26 LAB
25(OH)D3+25(OH)D2 SERPL-MCNC: 6 NG/ML (ref 30–96)
ALBUMIN SERPL BCP-MCNC: 1.3 G/DL (ref 3.5–5.2)
ANION GAP SERPL CALC-SCNC: 14 MMOL/L (ref 8–16)
ANISOCYTOSIS BLD QL SMEAR: SLIGHT
BASOPHILS NFR BLD: 0 % (ref 0–1.9)
BNP SERPL-MCNC: 192 PG/ML (ref 0–99)
BUN SERPL-MCNC: 55 MG/DL (ref 6–20)
C3 SERPL-MCNC: 129 MG/DL (ref 50–180)
C4 SERPL-MCNC: 20 MG/DL (ref 11–44)
CALCIUM SERPL-MCNC: 7.5 MG/DL (ref 8.7–10.5)
CHLORIDE SERPL-SCNC: 98 MMOL/L (ref 95–110)
CK SERPL-CCNC: 206 U/L (ref 20–200)
CO2 SERPL-SCNC: 18 MMOL/L (ref 23–29)
CREAT SERPL-MCNC: 6.2 MG/DL (ref 0.5–1.4)
DIFFERENTIAL METHOD: ABNORMAL
EOSINOPHIL NFR BLD: 1 % (ref 0–8)
EOSINOPHIL URNS QL WRIGHT STN: NORMAL
ERYTHROCYTE [DISTWIDTH] IN BLOOD BY AUTOMATED COUNT: 12.5 % (ref 11.5–14.5)
EST. GFR  (AFRICAN AMERICAN): 12 ML/MIN/1.73 M^2
EST. GFR  (NON AFRICAN AMERICAN): 11 ML/MIN/1.73 M^2
GLUCOSE SERPL-MCNC: 272 MG/DL (ref 70–110)
HCT VFR BLD AUTO: 28.2 % (ref 40–54)
HGB BLD-MCNC: 9.5 G/DL (ref 14–18)
HIV1+2 IGG SERPL QL IA.RAPID: NORMAL
IMM GRANULOCYTES # BLD AUTO: ABNORMAL K/UL (ref 0–0.04)
IMM GRANULOCYTES NFR BLD AUTO: ABNORMAL % (ref 0–0.5)
LYMPHOCYTES NFR BLD: 19 % (ref 18–48)
MCH RBC QN AUTO: 28.2 PG (ref 27–31)
MCHC RBC AUTO-ENTMCNC: 33.7 G/DL (ref 32–36)
MCV RBC AUTO: 84 FL (ref 82–98)
METAMYELOCYTES NFR BLD MANUAL: 1 %
MONOCYTES NFR BLD: 7 % (ref 4–15)
NEUTROPHILS NFR BLD: 66 % (ref 38–73)
NEUTS BAND NFR BLD MANUAL: 6 %
NRBC BLD-RTO: 0 /100 WBC
PHOSPHATE SERPL-MCNC: 6.1 MG/DL (ref 2.7–4.5)
PLATELET # BLD AUTO: 212 K/UL (ref 150–350)
PMV BLD AUTO: 11.2 FL (ref 9.2–12.9)
POCT GLUCOSE: 191 MG/DL (ref 70–110)
POCT GLUCOSE: 261 MG/DL (ref 70–110)
POCT GLUCOSE: 276 MG/DL (ref 70–110)
POCT GLUCOSE: 295 MG/DL (ref 70–110)
POTASSIUM SERPL-SCNC: 3.8 MMOL/L (ref 3.5–5.1)
RBC # BLD AUTO: 3.37 M/UL (ref 4.6–6.2)
SODIUM SERPL-SCNC: 130 MMOL/L (ref 136–145)
SODIUM UR-SCNC: 28 MMOL/L (ref 20–250)
VANCOMYCIN SERPL-MCNC: 33.2 UG/ML
WBC # BLD AUTO: 26.45 K/UL (ref 3.9–12.7)

## 2020-07-26 PROCEDURE — 86160 COMPLEMENT ANTIGEN: CPT

## 2020-07-26 PROCEDURE — 86334 IMMUNOFIX E-PHORESIS SERUM: CPT | Mod: 26,,, | Performed by: PATHOLOGY

## 2020-07-26 PROCEDURE — 85027 COMPLETE CBC AUTOMATED: CPT

## 2020-07-26 PROCEDURE — 80069 RENAL FUNCTION PANEL: CPT

## 2020-07-26 PROCEDURE — 86038 ANTINUCLEAR ANTIBODIES: CPT

## 2020-07-26 PROCEDURE — 86334 IMMUNOFIX E-PHORESIS SERUM: CPT

## 2020-07-26 PROCEDURE — 25000003 PHARM REV CODE 250: Performed by: HOSPITALIST

## 2020-07-26 PROCEDURE — 84165 PROTEIN E-PHORESIS SERUM: CPT

## 2020-07-26 PROCEDURE — 86334 PATHOLOGIST INTERPRETATION IFE: ICD-10-PCS | Mod: 26,,, | Performed by: PATHOLOGY

## 2020-07-26 PROCEDURE — 11000001 HC ACUTE MED/SURG PRIVATE ROOM

## 2020-07-26 PROCEDURE — 86703 HIV-1/HIV-2 1 RESULT ANTBDY: CPT

## 2020-07-26 PROCEDURE — 25000003 PHARM REV CODE 250: Performed by: SURGERY

## 2020-07-26 PROCEDURE — 85007 BL SMEAR W/DIFF WBC COUNT: CPT

## 2020-07-26 PROCEDURE — 80074 ACUTE HEPATITIS PANEL: CPT

## 2020-07-26 PROCEDURE — 82550 ASSAY OF CK (CPK): CPT

## 2020-07-26 PROCEDURE — 86160 COMPLEMENT ANTIGEN: CPT | Mod: 59

## 2020-07-26 PROCEDURE — 63600175 PHARM REV CODE 636 W HCPCS: Performed by: SURGERY

## 2020-07-26 PROCEDURE — 80202 ASSAY OF VANCOMYCIN: CPT

## 2020-07-26 PROCEDURE — 83880 ASSAY OF NATRIURETIC PEPTIDE: CPT

## 2020-07-26 PROCEDURE — 36415 COLL VENOUS BLD VENIPUNCTURE: CPT

## 2020-07-26 PROCEDURE — 63600175 PHARM REV CODE 636 W HCPCS: Performed by: NURSE PRACTITIONER

## 2020-07-26 PROCEDURE — 25000003 PHARM REV CODE 250: Performed by: INTERNAL MEDICINE

## 2020-07-26 PROCEDURE — 86255 FLUORESCENT ANTIBODY SCREEN: CPT | Mod: 91

## 2020-07-26 RX ORDER — DIPHENHYDRAMINE HCL 25 MG
25 CAPSULE ORAL EVERY 6 HOURS PRN
Status: DISCONTINUED | OUTPATIENT
Start: 2020-07-26 | End: 2020-07-31 | Stop reason: HOSPADM

## 2020-07-26 RX ORDER — SODIUM CHLORIDE 450 MG/100ML
INJECTION, SOLUTION INTRAVENOUS CONTINUOUS
Status: DISCONTINUED | OUTPATIENT
Start: 2020-07-26 | End: 2020-07-31 | Stop reason: HOSPADM

## 2020-07-26 RX ORDER — HEPARIN SODIUM 5000 [USP'U]/ML
5000 INJECTION, SOLUTION INTRAVENOUS; SUBCUTANEOUS EVERY 8 HOURS
Status: DISCONTINUED | OUTPATIENT
Start: 2020-07-26 | End: 2020-07-31 | Stop reason: HOSPADM

## 2020-07-26 RX ORDER — INSULIN ASPART 100 [IU]/ML
10 INJECTION, SOLUTION INTRAVENOUS; SUBCUTANEOUS
Status: DISCONTINUED | OUTPATIENT
Start: 2020-07-26 | End: 2020-07-31 | Stop reason: HOSPADM

## 2020-07-26 RX ORDER — MORPHINE SULFATE 10 MG/ML
5 INJECTION INTRAMUSCULAR; INTRAVENOUS; SUBCUTANEOUS ONCE
Status: COMPLETED | OUTPATIENT
Start: 2020-07-26 | End: 2020-07-26

## 2020-07-26 RX ADMIN — DIPHENHYDRAMINE HYDROCHLORIDE 25 MG: 25 CAPSULE ORAL at 12:07

## 2020-07-26 RX ADMIN — INSULIN DETEMIR 25 UNITS: 100 INJECTION, SOLUTION SUBCUTANEOUS at 09:07

## 2020-07-26 RX ADMIN — SODIUM CHLORIDE: 0.45 INJECTION, SOLUTION INTRAVENOUS at 05:07

## 2020-07-26 RX ADMIN — INSULIN ASPART 2 UNITS: 100 INJECTION, SOLUTION INTRAVENOUS; SUBCUTANEOUS at 08:07

## 2020-07-26 RX ADMIN — INSULIN ASPART 5 UNITS: 100 INJECTION, SOLUTION INTRAVENOUS; SUBCUTANEOUS at 11:07

## 2020-07-26 RX ADMIN — INSULIN ASPART 3 UNITS: 100 INJECTION, SOLUTION INTRAVENOUS; SUBCUTANEOUS at 05:07

## 2020-07-26 RX ADMIN — DIPHENHYDRAMINE HYDROCHLORIDE 25 MG: 25 CAPSULE ORAL at 07:07

## 2020-07-26 RX ADMIN — MEROPENEM AND SODIUM CHLORIDE 1 G: 1 INJECTION, SOLUTION INTRAVENOUS at 12:07

## 2020-07-26 RX ADMIN — HEPARIN SODIUM 5000 UNITS: 5000 INJECTION INTRAVENOUS; SUBCUTANEOUS at 09:07

## 2020-07-26 RX ADMIN — SODIUM CHLORIDE: 0.9 INJECTION, SOLUTION INTRAVENOUS at 12:07

## 2020-07-26 RX ADMIN — OXYCODONE HYDROCHLORIDE AND ACETAMINOPHEN 1 TABLET: 5; 325 TABLET ORAL at 09:07

## 2020-07-26 RX ADMIN — INSULIN ASPART 3 UNITS: 100 INJECTION, SOLUTION INTRAVENOUS; SUBCUTANEOUS at 11:07

## 2020-07-26 RX ADMIN — OXYCODONE HYDROCHLORIDE AND ACETAMINOPHEN 1 TABLET: 5; 325 TABLET ORAL at 05:07

## 2020-07-26 RX ADMIN — OXYCODONE HYDROCHLORIDE AND ACETAMINOPHEN 1 TABLET: 5; 325 TABLET ORAL at 04:07

## 2020-07-26 RX ADMIN — HEPARIN SODIUM 5000 UNITS: 5000 INJECTION INTRAVENOUS; SUBCUTANEOUS at 02:07

## 2020-07-26 RX ADMIN — INSULIN ASPART 5 UNITS: 100 INJECTION, SOLUTION INTRAVENOUS; SUBCUTANEOUS at 08:07

## 2020-07-26 RX ADMIN — ONDANSETRON HYDROCHLORIDE 4 MG: 2 SOLUTION INTRAMUSCULAR; INTRAVENOUS at 11:07

## 2020-07-26 RX ADMIN — MEROPENEM AND SODIUM CHLORIDE 1 G: 1 INJECTION, SOLUTION INTRAVENOUS at 04:07

## 2020-07-26 RX ADMIN — INSULIN ASPART 10 UNITS: 100 INJECTION, SOLUTION INTRAVENOUS; SUBCUTANEOUS at 05:07

## 2020-07-26 RX ADMIN — OXYCODONE HYDROCHLORIDE AND ACETAMINOPHEN 1 TABLET: 5; 325 TABLET ORAL at 12:07

## 2020-07-26 RX ADMIN — SODIUM CHLORIDE: 0.45 INJECTION, SOLUTION INTRAVENOUS at 07:07

## 2020-07-26 RX ADMIN — MEROPENEM AND SODIUM CHLORIDE 1 G: 1 INJECTION, SOLUTION INTRAVENOUS at 08:07

## 2020-07-26 RX ADMIN — OXYCODONE HYDROCHLORIDE AND ACETAMINOPHEN 1 TABLET: 5; 325 TABLET ORAL at 01:07

## 2020-07-26 RX ADMIN — MORPHINE SULFATE 5 MG: 10 INJECTION INTRAVENOUS at 08:07

## 2020-07-26 RX ADMIN — ATORVASTATIN CALCIUM 20 MG: 10 TABLET, FILM COATED ORAL at 08:07

## 2020-07-26 NOTE — CONSULTS
Reason for consultation:  Renal failure    HPI:  36 yo AA man with h/o HTN, DM type 1 was admitted to the hospital for sepsis, (L) thigh abscess s/p I&D.  His serum creatinine is progressive worsening from 2.1 on admission to 5.0.  Nephrology is consulted for evaluation of renal failure.  He had CT scan of the abdomen/pelvis with contrast on admission.  At this time he reports feeling OK, denies any problem with chest pain, SOB, fever,chills, N/V.    PMH:  As above    Scheduled Meds:   atorvastatin  20 mg Oral QHS    insulin aspart U-100  5 Units Subcutaneous TIDWM    insulin detemir U-100  15 Units Subcutaneous QHS    meropenem (MERREM) IVPB  1 g Intravenous Q8H       Review of patient's allergies indicates:  No Known Allergies    Family history:  Non contributory    Social history:  (+) tobacco    Vital Signs Range (Last 24H):  Temp:  [98 °F (36.7 °C)-99.9 °F (37.7 °C)]   Pulse:  []   Resp:  [14-24]   BP: (108-149)/(64-88)   SpO2:  [97 %-100 %]     I & O (Last 24H):    Intake/Output Summary (Last 24 hours) at 7/26/2020 0632  Last data filed at 7/25/2020 1722  Gross per 24 hour   Intake 2240 ml   Output --   Net 2240 ml           Physical Exam:  General appearance: well developed, well nourished, no distress  Lungs:  clear to auscultation bilaterally and normal respiratory effort  Heart: regular rate and rhythm  Abdomen: soft, non-tender non-distented; bowel sounds normal; no masses,  no organomegaly  Extremities: no cyanosis or edema, or clubbing    Laboratory:  CBC:   Recent Labs   Lab 07/25/20 0528   WBC 22.00*   RBC 3.41*   HGB 9.8*   HCT 28.7*      MCV 84   MCH 28.7   MCHC 34.1     CMP:   Recent Labs   Lab 07/25/20 0528 07/25/20  1807   * 198*   CALCIUM 7.9* 8.2*   ALBUMIN 1.5* 1.4*   PROT 5.8*  --    * 127*   K 3.4* 4.2   CO2 19* 19*   CL 99 99   BUN 37* 44*   CREATININE 3.4* 5.0*   ALKPHOS 156*  --    ALT 9*  --    AST 10  --    BILITOT 0.2  --      Recent Labs   Lab  07/25/20 2045   COLORU Yellow   SPECGRAV 1.020   PHUR 5.0   PROTEINUA 3+*   BACTERIA Moderate*   NITRITE Negative   LEUKOCYTESUR Negative   UROBILINOGEN Negative   HYALINECASTS 6*       Impression:    NORAH - most likely ATN due to infection in combination with contrast induced nephropathy.  CKD stage 3  Proteinuria - most likely related to DM nephropathy, nephrotic range  (L) thigh abscess s/p I&D  HTN  DM type 1  Anemia of chronic disease    Discussion/Recommendations:    Urine for EOS.  Continue IVF  LALO, hepatitis panel, HIV, ANCA, SPEP and complements   He reports no h/o DM retinopathy, may need kidney biopsy to determine cause of proteinuria  Watch renal function and electrolytes closely  No indication for dialysis at this time  We'll follow    Thank you for the consultation      Jorge Mackay  7/26/2020

## 2020-07-26 NOTE — PLAN OF CARE
Problem: Adult Inpatient Plan of Care  Goal: Plan of Care Review  Outcome: Ongoing, Progressing       Tele # [id number], wound care to L thigh conducted dressing C/D/I.   Pt free from falls and injury throughout shift. Pt AAOx4. Continued medications as ordered. Complaints of pain 6/10, c/o itchy skin and c/o of nausea controlled with medications. Pt in no distress. Will continue to monitor. 1/2NS 100mL/hr toR hand

## 2020-07-26 NOTE — PLAN OF CARE
SW met with patient to complete discharge needs assessment.  Patient was able to verbalize his help at home is mother, Farhat Henao. SW discussed with patient the things he's responsible for to manage his health at home would be by going on his doctor appointments, taking medications as prescribed, and getting prescriptions filled. SW wrote name and phone number on white communication board. Patient prefers early morning appointments.    PCP-Lani Rolle ([facility])       07/26/20 1319   Discharge Assessment   Assessment Type Discharge Planning Assessment   Confirmed/corrected address and phone number on facesheet? Yes   Assessment information obtained from? Patient   Communicated expected length of stay with patient/caregiver no   Prior to hospitilization cognitive status: Alert/Oriented   Prior to hospitalization functional status: Independent   Current cognitive status: Alert/Oriented   Current Functional Status: Independent   Facility Arrived From: Pt transported to the hospital.   Lives With parent(s)   Able to Return to Prior Arrangements yes   Is patient able to care for self after discharge? Yes   Who are your caregiver(s) and their phone number(s)? Juani Henao, mother [phone]   Patient's perception of discharge disposition home or selfcare   Readmission Within the Last 30 Days no previous admission in last 30 days   Patient currently being followed by outpatient case management? No   Patient currently receives any other outside agency services? No   Equipment Currently Used at Home none   Do you have any problems affording any of your prescribed medications? No   Is the patient taking medications as prescribed? yes   Does the patient have transportation home? Yes   Transportation Anticipated family or friend will provide   Dialysis Name and Scheduled days Not Applicable.   Discharge Plan A Home with family   DME Needed Upon Discharge  none   Patient/Family in  Agreement with Plan yes

## 2020-07-26 NOTE — PROGRESS NOTES
Pharmacokinetic Assessment Follow Up: IV Vancomycin    Vancomycin serum concentration assessment(s):    The random level was drawn correctly and can be used to guide therapy at this time. The measurement is above the desired definitive target range of 10 to 20 mcg/mL.    Vancomycin Regimen Plan:    Re-dose when the random level is less than 20 mcg/mL, next level to be drawn at 04:00 on 7/27  No Vanco dose to be given today    Drug levels (last 3 results):  Recent Labs   Lab Result Units 07/26/20  0520   Vancomycin, Random ug/mL 33.2       Pharmacy will continue to follow and monitor vancomycin.    Please contact pharmacy at extension [phone] for questions regarding this assessment.    Thank you for the consult,   Valentine García       Patient brief summary:  Chas Henao is a 35 y.o. male initiated on antimicrobial therapy with IV Vancomycin for treatment of skin & soft tissue infection    The patient's current regimen is Vanco Pulse Dosing    Drug Allergies:   Review of patient's allergies indicates:  No Known Allergies    Actual Body Weight:   102.1 kg    Renal Function:   Estimated Creatinine Clearance: 21.2 mL/min (A) (based on SCr of 6.2 mg/dL (H)).,     Dialysis Method (if applicable):  N/A    CBC (last 72 hours):  Recent Labs   Lab Result Units 07/24/20  1302 07/24/20  1536 07/25/20  0528 07/26/20  0520   WBC K/uL 22.45*  --  22.00* 26.45*   Hemoglobin g/dL 11.6*  --  9.8* 9.5*   Hemoglobin A1C %  --  12.4*  --   --    Hematocrit % 34.0*  --  28.7* 28.2*   Platelets K/uL 222  --  194 212   Gran% % 83.5*  --  70.0  --    Lymph% % 7.6*  --  15.0*  --    Mono% % 7.0  --  5.0  --    Eosinophil% % 0.1  --  0.0  --    Basophil% % 0.2  --  0.0  --    Differential Method  Automated  --  Manual  --        Metabolic Panel (last 72 hours):  Recent Labs   Lab Result Units 07/24/20  1536 07/25/20  0528 07/25/20  1058 07/25/20  1059 07/25/20  1807 07/25/20  2045 07/26/20  0520   Sodium mmol/L 133* 128*  --   --  127*  --   130*   Potassium mmol/L 3.4* 3.4*  --   --  4.2  --  3.8   Chloride mmol/L 103 99  --   --  99  --  98   CO2 mmol/L 21* 19*  --   --  19*  --  18*   Glucose mg/dL 202* 249*  --   --  198*  --  272*   Glucose, UA   --   --   --  2+*  --  2+*  --    BUN, Bld mg/dL 33* 37*  --   --  44*  --  55*   Creatinine mg/dL 2.1* 3.4*  --   --  5.0*  --  6.2*   Creatinine, Random Ur mg/dL  --   --  75.6  --   --   --   --    Albumin g/dL 1.6* 1.5*  --   --  1.4*  --  1.3*   Total Bilirubin mg/dL 0.3 0.2  --   --   --   --   --    Alkaline Phosphatase U/L 106 156*  --   --   --   --   --    AST U/L 12 10  --   --   --   --   --    ALT U/L 6* 9*  --   --   --   --   --    Magnesium mg/dL  --  1.4*  --   --   --   --   --    Phosphorus mg/dL  --   --   --   --  5.3*  --  6.1*       Vancomycin Administrations:  vancomycin given in the last 96 hours                     vancomycin (VANCOCIN) 2,000 mg in dextrose 5 % 500 mL IVPB (mg) 2,000 mg New Bag 07/25/20 1425                    Microbiologic Results:  Microbiology Results (last 7 days)       Procedure Component Value Units Date/Time    Aerobic culture (Specify Source) **CANNOT BE ORDERED AS STAT** [[id number]] Collected: 07/24/20 1609    Order Status: Completed Specimen: Abscess Updated: 07/26/20 0915     Aerobic Bacterial Culture Further report to follow    Urine culture [[id number]] Collected: 07/25/20 2045    Order Status: No result Specimen: Urine Updated: 07/25/20 2228    Blood Culture #1 **CANNOT BE ORDERED STAT** [[id number]] Collected: 07/24/20 1329    Order Status: Completed Specimen: Blood from Peripheral, Antecubital, Right Updated: 07/25/20 1503     Blood Culture, Routine No Growth to date      No Growth to date    Blood Culture #2 **CANNOT BE ORDERED STAT** [[id number]] Collected: 07/24/20 1335    Order Status: Completed Specimen: Blood from Peripheral, Hand, Left Updated: 07/25/20 1503     Blood Culture, Routine No Growth to date      No Growth to date    Aerobic culture  [[id number]] Collected: 07/25/20 1330    Order Status: Sent Specimen: Abscess from Leg, Left Updated: 07/25/20 1422    Culture, Anaerobe [[id number]] Collected: 07/25/20 1330    Order Status: Sent Specimen: Abscess from Leg, Left Updated: 07/25/20 1428

## 2020-07-26 NOTE — PROGRESS NOTES
Surgery/postoperative day 1    Patient reports that he does feel better    Afebrile vital signs stable    Left groin dressing removed by me nice granulation bed starting diff form nice red tissue no purulence or necrotic tissue expressed    Status post I and D of left upper thigh area with some necrotizing fasciitis that was minimal with viable muscle    Did not need further debridement at this time    Local wound care only.  Will continue to follow

## 2020-07-26 NOTE — NURSING
Pt. Refused to have new orders for keith placed, voided 200ml of tino colored urine. Sending specimen to lab for urinalysis per MD order. Will bladder scan and notify MD.

## 2020-07-26 NOTE — SUBJECTIVE & OBJECTIVE
Interval History: Doing better today. Pain tolerable as long as no one touches area.     Review of Systems   Constitutional: Negative for chills and fever.   HENT: Negative for nosebleeds and tinnitus.    Eyes: Negative for photophobia and visual disturbance.   Respiratory: Negative for shortness of breath and wheezing.    Cardiovascular: Negative for chest pain, palpitations and leg swelling.   Gastrointestinal: Negative for abdominal distention, nausea and vomiting.   Genitourinary: Negative for dysuria, flank pain and hematuria.   Musculoskeletal: Negative for gait problem and joint swelling.   Skin: Positive for color change and wound. Negative for rash.   Neurological: Negative for seizures and syncope.     Objective:     Vital Signs (Most Recent):  Temp: 97.9 °F (36.6 °C) (07/26/20 1110)  Pulse: 98 (07/26/20 1110)  Resp: 18 (07/26/20 1110)  BP: 129/70 (07/26/20 1110)  SpO2: (!) 94 % (07/26/20 1110) Vital Signs (24h Range):  Temp:  [97.9 °F (36.6 °C)-99.9 °F (37.7 °C)] 97.9 °F (36.6 °C)  Pulse:  [] 98  Resp:  [14-24] 18  SpO2:  [94 %-100 %] 94 %  BP: (108-136)/(64-88) 129/70     Weight: 102.1 kg (225 lb)  Body mass index is 28.89 kg/m².    Intake/Output Summary (Last 24 hours) at 7/26/2020 1248  Last data filed at 7/26/2020 0855  Gross per 24 hour   Intake 3804.58 ml   Output 200 ml   Net 3604.58 ml      Physical Exam  Vitals signs and nursing note reviewed.   Constitutional:       General: He is not in acute distress.     Appearance: He is well-developed.   HENT:      Head: Normocephalic and atraumatic.      Right Ear: External ear normal.      Left Ear: External ear normal.   Eyes:      General:         Right eye: No discharge.         Left eye: No discharge.      Conjunctiva/sclera: Conjunctivae normal.   Neck:      Musculoskeletal: Normal range of motion.      Thyroid: No thyromegaly.   Cardiovascular:      Rate and Rhythm: Normal rate and regular rhythm.      Heart sounds: No murmur.   Pulmonary:       Effort: Pulmonary effort is normal. No respiratory distress.      Breath sounds: Normal breath sounds.   Abdominal:      General: Bowel sounds are normal. There is no distension.      Palpations: Abdomen is soft. There is no mass.      Tenderness: There is no abdominal tenderness.   Musculoskeletal:         General: No deformity.   Skin:     General: Skin is warm and dry.      Comments: 7/23 Area of indurance to left medial thigh with active bloody drainage. Being I&D by ED staff. Does not appear to involved rectum. Janae Sawyer PA-C and Dr. Puga of ED at bedside during exam  7/24 2 open wounds draining serosan material . TTP. Patient ask for exam to be stop due to pain  7/25- wound packing noted with serosang material on guaze    Neurological:      Mental Status: He is alert and oriented to person, place, and time.   Psychiatric:         Behavior: Behavior normal.         Significant Labs: All pertinent labs within the past 24 hours have been reviewed.    Significant Imaging: I have reviewed and interpreted all pertinent imaging results/findings within the past 24 hours.   tea

## 2020-07-26 NOTE — ASSESSMENT & PLAN NOTE
"Cr of 2.1 on arrival. Last to compare in December of last year 2.6. Started on IVF as received IV contrast.   US renal showed bilateral elevated renal indices, bilateral renal cysts, and normal size kidney  Multifactorial -Did receive contrast IV with CT , sepsis, and acute fluctuation n blood pressure?  Reports chronic use of NSAID "all kinds" for pain   He is not aware of CKD, denies family members on dialysis  Last UA on file 2010 no protein  Rising sCr 2.1>3.4>5.0>6.2  His albumin is 1.5, UPCR 10 g  Appreciate Nephrology consult -NORAH mostly likely due to ATN from infection and HERSON  HIV negative  Urine EOS pending  LALO, hep paenal, ANCA, SPEP and complements pending  No history for DM retinopathy; need kidney biopsy to determine cause of proteinura    ml /24 hrs  As of 1 pm today approximately 100 ml UOP so far  Continue IVF  Follow Vanc level closely              "

## 2020-07-26 NOTE — ASSESSMENT & PLAN NOTE
Dx age 22 . Paternal GM had DM  hgbA1c 12.4  On 45units of levemir nightly and novolog 14 with meals at home.  He does not check in blood sugars at home but when he does it range 110-300's.He does not know his A1c  Goal -180  Still uncontrolled upper 200's, careful due to renal function, increase basal 15 to 25 units and prandial 5 to 10 units with SSS.

## 2020-07-26 NOTE — PROGRESS NOTES
[facility] Hospital Medicine  Progress Note    Patient Name: Chas Henao  MRN: [id number]  Patient Class: IP- Inpatient   Admission Date: 7/24/2020  Length of Stay: 1 days  Attending Physician: Obed Carroll MD  Primary Care Provider: Primary Doctor No        Subjective:     Principal Problem:Necrotizing fasciitis        HPI:  Chas Henao 35 y.o. male with type 1 diabetes, HTN, and HLD presents to the hospital with a chief complaint of leg pain. He reports 1 week ago he saw what he thought was a pimple to his left medial thigh that he attempted to express without success and the abscess expanded. Over the last week he has experienced an increasing constant throbbing pain without radiation improved with I&D of abscess in the ED and worsened with ambulation. He denies any trauma or injury to the leg. He has not lost control of his bowel/bladder. He denies fever, chest pain, SOB, N/V, abdominal pain, dysuria, dizziness, syncope.     In the ED, tachycardic to 105, WBC of 22, abscess seen on CT, lactic acid normal, Cr of 2.1.     Overview/Hospital Course:  Chas Henao 35 y.o. male placed in observation for left upper thigh abscess, NORAH and sepsis.  I&D in ED. Rocephin/Flagyl/doxy started on admission. Patient received CT with contrast on admission. General surgery and Nephrology following. US renal showed normal side kidneys, bilateral kidneys and bilateral elevated renal indices. 7/25 Underwent I & D of left upper thigh area that showed some necrotizing fasciitis that was minimal with viable.  Antibiotics switched to vancomycin and meropenem IV.  Renal function continued to worsen.  On 07/26 urine output approximately 500 mL over 24 hr.  Serum creatinine 2.1 >2.4>5.0>6.2 today WBC 22k>26 K . Fever subsided. Surgery felt no further debridement at this time. Nephrology felt ATN and continue IVF.      Interval History: Doing better today. Pain tolerable as long as no one touches area.      Review of Systems   Constitutional: Negative for chills and fever.   HENT: Negative for nosebleeds and tinnitus.    Eyes: Negative for photophobia and visual disturbance.   Respiratory: Negative for shortness of breath and wheezing.    Cardiovascular: Negative for chest pain, palpitations and leg swelling.   Gastrointestinal: Negative for abdominal distention, nausea and vomiting.   Genitourinary: Negative for dysuria, flank pain and hematuria.   Musculoskeletal: Negative for gait problem and joint swelling.   Skin: Positive for color change and wound. Negative for rash.   Neurological: Negative for seizures and syncope.     Objective:     Vital Signs (Most Recent):  Temp: 97.9 °F (36.6 °C) (07/26/20 1110)  Pulse: 98 (07/26/20 1110)  Resp: 18 (07/26/20 1110)  BP: 129/70 (07/26/20 1110)  SpO2: (!) 94 % (07/26/20 1110) Vital Signs (24h Range):  Temp:  [97.9 °F (36.6 °C)-99.9 °F (37.7 °C)] 97.9 °F (36.6 °C)  Pulse:  [] 98  Resp:  [14-24] 18  SpO2:  [94 %-100 %] 94 %  BP: (108-136)/(64-88) 129/70     Weight: 102.1 kg (225 lb)  Body mass index is 28.89 kg/m².    Intake/Output Summary (Last 24 hours) at 7/26/2020 1248  Last data filed at 7/26/2020 0855  Gross per 24 hour   Intake 3804.58 ml   Output 200 ml   Net 3604.58 ml      Physical Exam  Vitals signs and nursing note reviewed.   Constitutional:       General: He is not in acute distress.     Appearance: He is well-developed.   HENT:      Head: Normocephalic and atraumatic.      Right Ear: External ear normal.      Left Ear: External ear normal.   Eyes:      General:         Right eye: No discharge.         Left eye: No discharge.      Conjunctiva/sclera: Conjunctivae normal.   Neck:      Musculoskeletal: Normal range of motion.      Thyroid: No thyromegaly.   Cardiovascular:      Rate and Rhythm: Normal rate and regular rhythm.      Heart sounds: No murmur.   Pulmonary:      Effort: Pulmonary effort is normal. No respiratory distress.      Breath sounds:  "Normal breath sounds.   Abdominal:      General: Bowel sounds are normal. There is no distension.      Palpations: Abdomen is soft. There is no mass.      Tenderness: There is no abdominal tenderness.   Musculoskeletal:         General: No deformity.   Skin:     General: Skin is warm and dry.      Comments: 7/23 Area of indurance to left medial thigh with active bloody drainage. Being I&D by ED staff. Does not appear to involved rectum. Janae Sawyer PA-C and Dr. Puga of ED at bedside during exam  7/24 2 open wounds draining serosan material . TTP. Patient ask for exam to be stop due to pain  7/25- wound packing noted with serosang material on guaze    Neurological:      Mental Status: He is alert and oriented to person, place, and time.   Psychiatric:         Behavior: Behavior normal.         Significant Labs: All pertinent labs within the past 24 hours have been reviewed.    Significant Imaging: I have reviewed and interpreted all pertinent imaging results/findings within the past 24 hours.      Assessment/Plan:      * Necrotizing fasciitis  -Patient admitted to hospital for left upper thigh abscess  -CT with "Findings suggesting cellulitis involving the posteromedial proximal thigh soft tissues, with subcutaneous strand-like edema, extending to the fascial surface.  There is punctate subcutaneous emphysema within the superficial subcutaneous fat, adjacent to a small amount of disorganized fluid.  No discrete formed abscess at this time, however developing abscess is a consideration versus previously drained abscess."  - on admit Started on Rocephin/Doxy/Flagyl as CKD and received IV contrast  - 7/25switch to Vanc and meropenem-Pharmacy following ; ID consult (will see MOnday)  - 7/25 I&D of left upper thigh showed some necrotizing fascitis that was minimal with viable muscle  - 7/26 afebrile and tachycardia improved. WBC 22 >26 however he looks better today. Surgery felt no further debridement at this " "time.   -7/24 Blood culture no grown so far  -7/24 wound culture and 7/5 wound culture from surgery in progress  -Continue Vanc and meropenem       NORAH (acute kidney injury)  Cr of 2.1 on arrival. Last to compare in December of last year 2.6. Started on IVF as received IV contrast.   US renal showed bilateral elevated renal indices, bilateral renal cysts, and normal size kidney  Multifactorial -Did receive contrast IV with CT , sepsis, and acute fluctuation n blood pressure?  Reports chronic use of NSAID "all kinds" for pain   He is not aware of CKD, denies family members on dialysis  Last UA on file 2010 no protein  Rising sCr 2.1>3.4>5.0>6.2  His albumin is 1.5, UPCR 10 g  Appreciate Nephrology consult -NORAH mostly likely due to ATN from infection and HERSON  HIV negative  Urine EOS pending  LALO, hep paenal, ANCA, SPEP and complements pending  No history for DM retinopathy; need kidney biopsy to determine cause of proteinura    ml /24 hrs  As of 1 pm today approximately 100 ml UOP so far  Continue IVF  Follow Vanc level closely                Essential hypertension  Uncontrolled at home -170's and last week 's but he thinks blood pressure machine broken.   Holding home losartan for elevated creatinine and received IV contrast. Holding losartan.  BPbetter here likely due to sepsis.   Continue hydralazine prn      Diabetes mellitus type 1  Dx age 22 . Paternal GM had DM  hgbA1c 12.4  On 45units of levemir nightly and novolog 14 with meals at home.  He does not check in blood sugars at home but when he does it range 110-300's.He does not know his A1c  Goal -180  Still uncontrolled upper 200's, careful due to renal function, increase basal 15 to 25 units and prandial 5 to 10 units with SSS.       Tobacco user  Smokes 1/5 pack a week. Encourage smoking cessation. Declines nicotine patch. Not craving.      Anemia due to chronic kidney disease  Hgb 11. 6 on admit  Down 9.8  Possible from ID IVF and " sepesis contributing  No melena or hematochezia   Denies alcohol   Plt okay, T bili okay  Obtain iron studies, repeat hgb  Stable      Sepsis  WBC 22 >26 K, afebrile x 24 hrs, Tachycardia resolved currently  See above #1.     HLD (hyperlipidemia)  Obtain lipid panel  Continue home statin    VTE Risk Mitigation (From admission, onward)         Ordered     heparin (porcine) injection 5,000 Units  Every 8 hours      07/26/20 1313     Place CHRIS hose  Until discontinued      07/24/20 1843     IP VTE HIGH RISK PATIENT  Once      07/24/20 1801     Place sequential compression device  Until discontinued      07/24/20 1801                      Zandra Hunt NP  Department of Hospital Medicine   [facility]

## 2020-07-26 NOTE — NURSING
Only had 400ML output this shift. NP is aware. IVF infusing per MD order. Going for procedure this AM.NPO since midnight. Blood glucose monitored. UA sent to lab.

## 2020-07-26 NOTE — ASSESSMENT & PLAN NOTE
Hgb 11. 6 on admit  Down 9.8  Possible from ID IVF and sepesis contributing  No melena or hematochezia   Denies alcohol   Plt okay, T bili okay  Obtain iron studies, repeat hgb  Stable

## 2020-07-27 PROBLEM — A49.01 MSSA (METHICILLIN SUSCEPTIBLE STAPHYLOCOCCUS AUREUS): Status: ACTIVE | Noted: 2020-07-27

## 2020-07-27 LAB
ALBUMIN SERPL BCP-MCNC: 1.4 G/DL (ref 3.5–5.2)
ALBUMIN SERPL ELPH-MCNC: 1.43 G/DL (ref 3.35–5.55)
ALP SERPL-CCNC: 122 U/L (ref 55–135)
ALPHA1 GLOB SERPL ELPH-MCNC: 0.66 G/DL (ref 0.17–0.41)
ALPHA2 GLOB SERPL ELPH-MCNC: 1.15 G/DL (ref 0.43–0.99)
ALT SERPL W/O P-5'-P-CCNC: 11 U/L (ref 10–44)
ANION GAP SERPL CALC-SCNC: 17 MMOL/L (ref 8–16)
AORTIC ROOT ANNULUS: 3.06 CM
AORTIC VALVE CUSP SEPERATION: 2.31 CM
ASCENDING AORTA: 2.77 CM
AST SERPL-CCNC: 14 U/L (ref 10–40)
AV INDEX (PROSTH): 0.96
AV MEAN GRADIENT: 16 MMHG
AV PEAK GRADIENT: 25 MMHG
AV VALVE AREA: 3.94 CM2
AV VELOCITY RATIO: 0.94
B-GLOBULIN SERPL ELPH-MCNC: 0.36 G/DL (ref 0.5–1.1)
BACTERIA SPEC AEROBE CULT: ABNORMAL
BACTERIA UR CULT: NO GROWTH
BASOPHILS # BLD AUTO: 0.05 K/UL (ref 0–0.2)
BASOPHILS NFR BLD: 0.2 % (ref 0–1.9)
BILIRUB SERPL-MCNC: 0.1 MG/DL (ref 0.1–1)
BSA FOR ECHO PROCEDURE: 2.31 M2
BUN SERPL-MCNC: 68 MG/DL (ref 6–20)
CALCIUM SERPL-MCNC: 7.6 MG/DL (ref 8.7–10.5)
CHLORIDE SERPL-SCNC: 95 MMOL/L (ref 95–110)
CO2 SERPL-SCNC: 15 MMOL/L (ref 23–29)
CREAT SERPL-MCNC: 7.7 MG/DL (ref 0.5–1.4)
CV ECHO LV RWT: 0.82 CM
DIFFERENTIAL METHOD: ABNORMAL
DOP CALC AO PEAK VEL: 2.5 M/S
DOP CALC AO VTI: 33.28 CM
DOP CALC LVOT AREA: 4.1 CM2
DOP CALC LVOT DIAMETER: 2.29 CM
DOP CALC LVOT PEAK VEL: 2.36 M/S
DOP CALC LVOT STROKE VOLUME: 131.2 CM3
DOP CALCLVOT PEAK VEL VTI: 31.87 CM
E WAVE DECELERATION TIME: 191.62 MSEC
E/A RATIO: 0.89
ECHO LV POSTERIOR WALL: 1.68 CM (ref 0.6–1.1)
EOSINOPHIL # BLD AUTO: 0.1 K/UL (ref 0–0.5)
EOSINOPHIL NFR BLD: 0.3 % (ref 0–8)
ERYTHROCYTE [DISTWIDTH] IN BLOOD BY AUTOMATED COUNT: 12.7 % (ref 11.5–14.5)
EST. GFR  (AFRICAN AMERICAN): 10 ML/MIN/1.73 M^2
EST. GFR  (NON AFRICAN AMERICAN): 8 ML/MIN/1.73 M^2
FRACTIONAL SHORTENING: 46 % (ref 28–44)
GAMMA GLOB SERPL ELPH-MCNC: 1.1 G/DL (ref 0.67–1.58)
GLUCOSE SERPL-MCNC: 227 MG/DL (ref 70–110)
HAV IGM SERPL QL IA: NEGATIVE
HBV CORE IGM SERPL QL IA: ABNORMAL
HBV SURFACE AG SERPL QL IA: NEGATIVE
HCT VFR BLD AUTO: 28.1 % (ref 40–54)
HCV AB SERPL QL IA: NEGATIVE
HGB BLD-MCNC: 9.5 G/DL (ref 14–18)
IMM GRANULOCYTES # BLD AUTO: 0.69 K/UL (ref 0–0.04)
IMM GRANULOCYTES NFR BLD AUTO: 2.8 % (ref 0–0.5)
INTERVENTRICULAR SEPTUM: 1.75 CM (ref 0.6–1.1)
IVRT: 55.36 MSEC
LA MAJOR: 5.17 CM
LA MINOR: 5.07 CM
LA WIDTH: 2.81 CM
LEFT ATRIUM SIZE: 3.94 CM
LEFT ATRIUM VOLUME INDEX: 21.1 ML/M2
LEFT ATRIUM VOLUME: 48.18 CM3
LEFT INTERNAL DIMENSION IN SYSTOLE: 2.22 CM (ref 2.1–4)
LEFT VENTRICLE DIASTOLIC VOLUME INDEX: 32.84 ML/M2
LEFT VENTRICLE DIASTOLIC VOLUME: 75.04 ML
LEFT VENTRICLE MASS INDEX: 132 G/M2
LEFT VENTRICLE SYSTOLIC VOLUME INDEX: 7.3 ML/M2
LEFT VENTRICLE SYSTOLIC VOLUME: 16.58 ML
LEFT VENTRICULAR INTERNAL DIMENSION IN DIASTOLE: 4.12 CM (ref 3.5–6)
LEFT VENTRICULAR MASS: 300.48 G
LYMPHOCYTES # BLD AUTO: 2.3 K/UL (ref 1–4.8)
LYMPHOCYTES NFR BLD: 9.4 % (ref 18–48)
MCH RBC QN AUTO: 28.4 PG (ref 27–31)
MCHC RBC AUTO-ENTMCNC: 33.8 G/DL (ref 32–36)
MCV RBC AUTO: 84 FL (ref 82–98)
MONOCYTES # BLD AUTO: 1.5 K/UL (ref 0.3–1)
MONOCYTES NFR BLD: 6.1 % (ref 4–15)
MV PEAK A VEL: 1.27 M/S
MV PEAK E VEL: 1.13 M/S
NEUTROPHILS # BLD AUTO: 20.3 K/UL (ref 1.8–7.7)
NEUTROPHILS NFR BLD: 81.2 % (ref 38–73)
NRBC BLD-RTO: 0 /100 WBC
PISA TR MAX VEL: 3.88 M/S
PLATELET # BLD AUTO: 263 K/UL (ref 150–350)
PMV BLD AUTO: 11 FL (ref 9.2–12.9)
POCT GLUCOSE: 251 MG/DL (ref 70–110)
POCT GLUCOSE: 253 MG/DL (ref 70–110)
POCT GLUCOSE: 276 MG/DL (ref 70–110)
POCT GLUCOSE: 283 MG/DL (ref 70–110)
POTASSIUM SERPL-SCNC: 3.5 MMOL/L (ref 3.5–5.1)
PROT SERPL-MCNC: 4.7 G/DL (ref 6–8.4)
PROT SERPL-MCNC: 5.8 G/DL (ref 6–8.4)
PTH-INTACT SERPL-MCNC: 204 PG/ML (ref 9–77)
PULM VEIN S/D RATIO: 1.09
PV PEAK D VEL: 0.56 M/S
PV PEAK S VEL: 0.61 M/S
PV PEAK VELOCITY: 1.64 CM/S
RA MAJOR: 3.93 CM
RA PRESSURE: 3 MMHG
RA WIDTH: 3 CM
RBC # BLD AUTO: 3.34 M/UL (ref 4.6–6.2)
RIGHT VENTRICULAR END-DIASTOLIC DIMENSION: 3.66 CM
RV TISSUE DOPPLER FREE WALL SYSTOLIC VELOCITY 1 (APICAL 4 CHAMBER VIEW): 23.62 CM/S
SINUS: 3.1 CM
SODIUM SERPL-SCNC: 127 MMOL/L (ref 136–145)
STJ: 2.4 CM
TOXIC GRANULES BLD QL SMEAR: PRESENT
TR MAX PG: 60 MMHG
TRICUSPID ANNULAR PLANE SYSTOLIC EXCURSION: 2.11 CM
TV REST PULMONARY ARTERY PRESSURE: 63 MMHG
VANCOMYCIN SERPL-MCNC: 24.7 UG/ML
WBC # BLD AUTO: 24.94 K/UL (ref 3.9–12.7)

## 2020-07-27 PROCEDURE — 99233 PR SUBSEQUENT HOSPITAL CARE,LEVL III: ICD-10-PCS | Mod: ,,, | Performed by: INTERNAL MEDICINE

## 2020-07-27 PROCEDURE — 25000003 PHARM REV CODE 250: Performed by: HOSPITALIST

## 2020-07-27 PROCEDURE — 99233 SBSQ HOSP IP/OBS HIGH 50: CPT | Mod: ,,, | Performed by: INTERNAL MEDICINE

## 2020-07-27 PROCEDURE — 80202 ASSAY OF VANCOMYCIN: CPT

## 2020-07-27 PROCEDURE — 25000003 PHARM REV CODE 250: Performed by: INTERNAL MEDICINE

## 2020-07-27 PROCEDURE — 63600175 PHARM REV CODE 636 W HCPCS: Performed by: SURGERY

## 2020-07-27 PROCEDURE — 63600175 PHARM REV CODE 636 W HCPCS: Performed by: NURSE PRACTITIONER

## 2020-07-27 PROCEDURE — 80053 COMPREHEN METABOLIC PANEL: CPT

## 2020-07-27 PROCEDURE — 36415 COLL VENOUS BLD VENIPUNCTURE: CPT

## 2020-07-27 PROCEDURE — 85025 COMPLETE CBC W/AUTO DIFF WBC: CPT

## 2020-07-27 PROCEDURE — 63600175 PHARM REV CODE 636 W HCPCS: Performed by: INTERNAL MEDICINE

## 2020-07-27 PROCEDURE — 11000001 HC ACUTE MED/SURG PRIVATE ROOM

## 2020-07-27 PROCEDURE — 25000003 PHARM REV CODE 250: Performed by: SURGERY

## 2020-07-27 RX ORDER — PANTOPRAZOLE SODIUM 40 MG/1
40 TABLET, DELAYED RELEASE ORAL DAILY
Status: DISCONTINUED | OUTPATIENT
Start: 2020-07-27 | End: 2020-07-31 | Stop reason: HOSPADM

## 2020-07-27 RX ORDER — ATORVASTATIN CALCIUM 20 MG/1
20 TABLET, FILM COATED ORAL NIGHTLY
COMMUNITY

## 2020-07-27 RX ORDER — MEROPENEM AND SODIUM CHLORIDE 1 G/50ML
1 INJECTION, SOLUTION INTRAVENOUS
Status: DISCONTINUED | OUTPATIENT
Start: 2020-07-27 | End: 2020-07-27

## 2020-07-27 RX ORDER — LOSARTAN POTASSIUM 50 MG/1
50 TABLET ORAL DAILY
Status: ON HOLD | COMMUNITY
End: 2020-07-31 | Stop reason: HOSPADM

## 2020-07-27 RX ORDER — FAMOTIDINE 20 MG/1
20 TABLET, FILM COATED ORAL DAILY PRN
Status: DISCONTINUED | OUTPATIENT
Start: 2020-07-27 | End: 2020-07-31 | Stop reason: HOSPADM

## 2020-07-27 RX ADMIN — OXYCODONE HYDROCHLORIDE AND ACETAMINOPHEN 1 TABLET: 5; 325 TABLET ORAL at 11:07

## 2020-07-27 RX ADMIN — OXYCODONE HYDROCHLORIDE AND ACETAMINOPHEN 1 TABLET: 5; 325 TABLET ORAL at 09:07

## 2020-07-27 RX ADMIN — HEPARIN SODIUM 5000 UNITS: 5000 INJECTION INTRAVENOUS; SUBCUTANEOUS at 02:07

## 2020-07-27 RX ADMIN — OXYCODONE HYDROCHLORIDE AND ACETAMINOPHEN 1 TABLET: 5; 325 TABLET ORAL at 05:07

## 2020-07-27 RX ADMIN — ATORVASTATIN CALCIUM 20 MG: 10 TABLET, FILM COATED ORAL at 08:07

## 2020-07-27 RX ADMIN — INSULIN DETEMIR 25 UNITS: 100 INJECTION, SOLUTION SUBCUTANEOUS at 08:07

## 2020-07-27 RX ADMIN — INSULIN ASPART 3 UNITS: 100 INJECTION, SOLUTION INTRAVENOUS; SUBCUTANEOUS at 08:07

## 2020-07-27 RX ADMIN — SODIUM CHLORIDE: 0.45 INJECTION, SOLUTION INTRAVENOUS at 05:07

## 2020-07-27 RX ADMIN — INSULIN ASPART 10 UNITS: 100 INJECTION, SOLUTION INTRAVENOUS; SUBCUTANEOUS at 05:07

## 2020-07-27 RX ADMIN — DEXTROSE 6 G: 5 SOLUTION INTRAVENOUS at 10:07

## 2020-07-27 RX ADMIN — OXYCODONE HYDROCHLORIDE AND ACETAMINOPHEN 1 TABLET: 5; 325 TABLET ORAL at 02:07

## 2020-07-27 RX ADMIN — OXYCODONE HYDROCHLORIDE AND ACETAMINOPHEN 1 TABLET: 5; 325 TABLET ORAL at 06:07

## 2020-07-27 RX ADMIN — INSULIN ASPART 3 UNITS: 100 INJECTION, SOLUTION INTRAVENOUS; SUBCUTANEOUS at 05:07

## 2020-07-27 RX ADMIN — MEROPENEM AND SODIUM CHLORIDE 1 G: 1 INJECTION, SOLUTION INTRAVENOUS at 04:07

## 2020-07-27 RX ADMIN — INSULIN ASPART 3 UNITS: 100 INJECTION, SOLUTION INTRAVENOUS; SUBCUTANEOUS at 11:07

## 2020-07-27 RX ADMIN — HYDRALAZINE HYDROCHLORIDE 10 MG: 20 INJECTION INTRAMUSCULAR; INTRAVENOUS at 09:07

## 2020-07-27 RX ADMIN — DIPHENHYDRAMINE HYDROCHLORIDE 25 MG: 25 CAPSULE ORAL at 08:07

## 2020-07-27 RX ADMIN — SODIUM CHLORIDE: 0.45 INJECTION, SOLUTION INTRAVENOUS at 04:07

## 2020-07-27 RX ADMIN — INSULIN ASPART 10 UNITS: 100 INJECTION, SOLUTION INTRAVENOUS; SUBCUTANEOUS at 11:07

## 2020-07-27 RX ADMIN — ONDANSETRON HYDROCHLORIDE 4 MG: 2 SOLUTION INTRAMUSCULAR; INTRAVENOUS at 12:07

## 2020-07-27 RX ADMIN — FAMOTIDINE 20 MG: 20 TABLET ORAL at 10:07

## 2020-07-27 RX ADMIN — INSULIN ASPART 1 UNITS: 100 INJECTION, SOLUTION INTRAVENOUS; SUBCUTANEOUS at 08:07

## 2020-07-27 RX ADMIN — HEPARIN SODIUM 5000 UNITS: 5000 INJECTION INTRAVENOUS; SUBCUTANEOUS at 09:07

## 2020-07-27 RX ADMIN — INSULIN ASPART 10 UNITS: 100 INJECTION, SOLUTION INTRAVENOUS; SUBCUTANEOUS at 08:07

## 2020-07-27 RX ADMIN — PANTOPRAZOLE SODIUM 40 MG: 40 TABLET, DELAYED RELEASE ORAL at 08:07

## 2020-07-27 RX ADMIN — HEPARIN SODIUM 5000 UNITS: 5000 INJECTION INTRAVENOUS; SUBCUTANEOUS at 06:07

## 2020-07-27 NOTE — PROGRESS NOTES
Pt is currently uninsured and may need HD per Neph notes; [place] in to speak with patient regarding insurance and applying for Medicaid; pt stated that he had Medicaid before but lost the benefit when he started working; stated that he is currently not working at the moment; TN provided patient with website and phone number to [place] Medicaid and encouraged to apply ASAP; pt verbalized understanding;     email sent to Naz Troncoso to inform of above and to provide pt's cell phone # [phone]

## 2020-07-27 NOTE — PROGRESS NOTES
Pharmacokinetic Assessment Follow Up: IV Vancomycin    Vancomycin serum concentration assessment(s):    The random level was drawn correctly and can be used to guide therapy at this time. The measurement is above the desired definitive target range of 10 to 20 mcg/mL.    Vancomycin Regimen Plan:    No dose today.  Re-dose when the random level is less than 20 mcg/mL, next level to be drawn at 0400 on 7/28/2020    Drug levels (last 3 results):  Recent Labs   Lab Result Units 07/26/20  0520 07/27/20  0448   Vancomycin, Random ug/mL 33.2 24.7       Pharmacy will continue to follow and monitor vancomycin.    Please contact pharmacy at extension [phone] for questions regarding this assessment.    Thank you for the consult,   Arcenio Mendes Jr       Patient brief summary:  Chas Henao is a 35 y.o. male initiated on antimicrobial therapy with IV Vancomycin for treatment of skin & soft tissue infection    Drug Allergies:   Review of patient's allergies indicates:  No Known Allergies    Actual Body Weight:   102.1 kg    Renal Function:   Estimated Creatinine Clearance: 17.1 mL/min (A) (based on SCr of 7.7 mg/dL (H)).,     Dialysis Method (if applicable):  N/A    CBC (last 72 hours):  Recent Labs   Lab Result Units 07/24/20  1302 07/24/20  1536 07/25/20  0528 07/26/20  0520 07/27/20  0448   WBC K/uL 22.45*  --  22.00* 26.45* 24.94*   Hemoglobin g/dL 11.6*  --  9.8* 9.5* 9.5*   Hemoglobin A1C %  --  12.4*  --   --   --    Hematocrit % 34.0*  --  28.7* 28.2* 28.1*   Platelets K/uL 222  --  194 212 263   Gran% % 83.5*  --  70.0 66.0 81.2*   Lymph% % 7.6*  --  15.0* 19.0 9.4*   Mono% % 7.0  --  5.0 7.0 6.1   Eosinophil% % 0.1  --  0.0 1.0 0.3   Basophil% % 0.2  --  0.0 0.0 0.2   Differential Method  Automated  --  Manual Manual Automated       Metabolic Panel (last 72 hours):  Recent Labs   Lab Result Units 07/24/20  1536 07/25/20  0528 07/25/20  1058 07/25/20  1059 07/25/20  1807 07/25/20  2045 07/26/20  0520 07/27/20  0449    Sodium mmol/L 133* 128*  --   --  127*  --  130* 127*   Sodium Urine Random mmol/L  --   --   --   --   --  28  --   --    Potassium mmol/L 3.4* 3.4*  --   --  4.2  --  3.8 3.5   Chloride mmol/L 103 99  --   --  99  --  98 95   CO2 mmol/L 21* 19*  --   --  19*  --  18* 15*   Glucose mg/dL 202* 249*  --   --  198*  --  272* 227*   Glucose, UA   --   --   --  2+*  --  2+*  --   --    BUN, Bld mg/dL 33* 37*  --   --  44*  --  55* 68*   Creatinine mg/dL 2.1* 3.4*  --   --  5.0*  --  6.2* 7.7*   Creatinine, Random Ur mg/dL  --   --  75.6  --   --   --   --   --    Albumin g/dL 1.6* 1.5*  --   --  1.4*  --  1.3* 1.4*   Total Bilirubin mg/dL 0.3 0.2  --   --   --   --   --  0.1   Alkaline Phosphatase U/L 106 156*  --   --   --   --   --  122   AST U/L 12 10  --   --   --   --   --  14   ALT U/L 6* 9*  --   --   --   --   --  11   Magnesium mg/dL  --  1.4*  --   --   --   --   --   --    Phosphorus mg/dL  --   --   --   --  5.3*  --  6.1*  --        Vancomycin Administrations:  vancomycin given in the last 96 hours                     vancomycin (VANCOCIN) 2,000 mg in dextrose 5 % 500 mL IVPB (mg) 2,000 mg New Bag 07/25/20 1425                    Microbiologic Results:  Microbiology Results (last 7 days)       Procedure Component Value Units Date/Time    Blood Culture #2 **CANNOT BE ORDERED STAT** [[id number]] Collected: 07/24/20 8095    Order Status: Completed Specimen: Blood from Peripheral, Hand, Left Updated: 07/26/20 150     Blood Culture, Routine No Growth to date      No Growth to date      No Growth to date    Blood Culture #1 **CANNOT BE ORDERED STAT** [[id number]] Collected: 07/24/20 1329    Order Status: Completed Specimen: Blood from Peripheral, Antecubital, Right Updated: 07/26/20 1503     Blood Culture, Routine No Growth to date      No Growth to date      No Growth to date    Urine culture [[id number]] Collected: 07/25/20 2045    Order Status: Completed Specimen: Urine Updated: 07/26/20 0949     Urine  Culture, Routine No growth to date    Narrative:      Specimen Source->Urine    Aerobic culture [[id number]] Collected: 07/25/20 1330    Order Status: Completed Specimen: Abscess from Leg, Left Updated: 07/26/20 0947     Aerobic Bacterial Culture Insufficient incubation, culture in progress    Aerobic culture (Specify Source) **CANNOT BE ORDERED AS STAT** [[id number]] Collected: 07/24/20 1609    Order Status: Completed Specimen: Abscess Updated: 07/26/20 0915     Aerobic Bacterial Culture Further report to follow    Culture, Anaerobe [[id number]] Collected: 07/25/20 1330    Order Status: Sent Specimen: Abscess from Leg, Left Updated: 07/25/20 6498

## 2020-07-27 NOTE — CONSULTS
[facility]  Infectious Disease  Consult Note    Patient Name: Chas Henao  MRN: [id number]  Admission Date: 7/24/2020  Hospital Length of Stay: 2 days  Attending Physician: Obed Carroll MD  Primary Care Provider: Primary Doctor No     Isolation Status: No active isolations    Patient information was obtained from patient and ER records.      Inpatient consult to Infectious Diseases  Consult performed by: Dhara Bañuelos MD  Consult ordered by: Zandra Hunt NP        Assessment/Plan:     MSSA (methicillin susceptible Staphylococcus aureus)  35M with T2DM, HTN, HLD admitted 7/24 with leg pain, swelling. Found to have thigh abscess. S/p I&D, no evidence of muscle necrosis. Cultures growing staph. Fevers resolved. Persistent leukocytosis fluctuance. Has received ceftriaxone, clindamycin, doxy, metronidazole, vanc, meropenem since admit. ID c/f abx recs     Recommendations:   - de-escalate to IV cefazolin while inpatient  - repeat CT to r/o remaining abscess  - plan to de-escalate to po cefadroxil x 14 days from date of I&D                  Thank you for your consult. I will follow-up with patient. Please contact us if you have any additional questions.    Dhara Bañuelos MD  Infectious Disease  [facility]    Subjective:     Principal Problem: Necrotizing fasciitis    HPI: 35M with T2DM, HTN, HLD admitted 7/24 with leg pain. He reports 1 week ago he saw what he thought was a pimple to his left medial thigh that he attempted to express without success and the area got bigger and more painful and came to the hospital. Denies trauma.      Ct of thigh with cellulitis to fascial surface, possible abscess.    Was taken for I&D of L thigh with general surgery    Reports leg feeling better, but says still feels tight. Having some drainage on surgical dressings. Report bad nausea. Denies diarrhea. Denies indwelling hardware.     Surgical cultures growing MSSA     Has received  ceftriaxone, clindamycin, doxy, metronidazole, vanc, meropenem since admit    Fever resolved. Leukocytosis persistant. ID c/f abx recs               Review of Systems   HENT: Negative for ear discharge and ear pain.    Eyes: Negative for discharge and itching.   Respiratory: Negative for shortness of breath.    Cardiovascular: Negative for chest pain.   Gastrointestinal: Positive for nausea and vomiting. Negative for diarrhea.   Endocrine: Negative for cold intolerance and heat intolerance.   Genitourinary: Negative for dysuria, frequency and urgency.   Skin: Negative for wound.   Neurological: Negative for seizures and syncope.     Objective:     Vital Signs (Most Recent):  Temp: 97.9 °F (36.6 °C) (07/27/20 1135)  Pulse: (!) 119 (07/27/20 1135)  Resp: 17 (07/27/20 1159)  BP: 133/64 (07/27/20 1135)  SpO2: 99 % (07/27/20 1135) Vital Signs (24h Range):  Temp:  [97.8 °F (36.6 °C)-98.3 °F (36.8 °C)] 97.9 °F (36.6 °C)  Pulse:  [103-128] 119  Resp:  [17-19] 17  SpO2:  [95 %-99 %] 99 %  BP: (133-170)/(64-89) 133/64     Weight: 102.1 kg (225 lb)  Body mass index is 28.89 kg/m².    Intake/Output Summary (Last 24 hours) at 7/27/2020 1331  Last data filed at 7/26/2020 1830  Gross per 24 hour   Intake 1250 ml   Output 325 ml   Net 925 ml      Physical Exam  Vitals signs and nursing note reviewed.   Constitutional:       General: He is not in acute distress.     Appearance: He is well-developed.   HENT:      Head: Normocephalic and atraumatic.      Right Ear: External ear normal.      Left Ear: External ear normal.   Eyes:      General:         Right eye: No discharge.         Left eye: No discharge.      Conjunctiva/sclera: Conjunctivae normal.   Neck:      Musculoskeletal: Normal range of motion.      Thyroid: No thyromegaly.   Cardiovascular:      Rate and Rhythm: Normal rate and regular rhythm.      Heart sounds: No murmur.   Pulmonary:      Effort: Pulmonary effort is normal. No respiratory distress.      Breath sounds:  Normal breath sounds.   Abdominal:      General: Bowel sounds are normal. There is no distension.      Palpations: Abdomen is soft. There is no mass.      Tenderness: There is no abdominal tenderness.   Musculoskeletal:         General: No deformity.   Skin:     General: Skin is warm and dry.      Comments: Wound packing noted with serosang material on guaze.  Tender to touch and mild surrounding induration   Neurological:      Mental Status: He is alert and oriented to person, place, and time.   Psychiatric:         Behavior: Behavior normal.         Significant Labs:   BMP:   Recent Labs   Lab 07/27/20  0449   *   *   K 3.5   CL 95   CO2 15*   BUN 68*   CREATININE 7.7*   CALCIUM 7.6*     CBC:   Recent Labs   Lab 07/26/20  0520 07/27/20  0448   WBC 26.45* 24.94*   HGB 9.5* 9.5*   HCT 28.2* 28.1*    263       Significant Imaging: I have reviewed all pertinent imaging results/findings within the past 24 hours.

## 2020-07-27 NOTE — PROGRESS NOTES
Pharmacist Renal Dose Adjustment Note    Chas Henao is a 35 y.o. male being treated with the medication meropenem    Patient Data:    Vital Signs (Most Recent):  Temp: 98.3 °F (36.8 °C) (07/27/20 0519)  Pulse: 103 (07/27/20 0543)  Resp: 19 (07/27/20 0620)  BP: 134/76 (07/27/20 0519)  SpO2: 96 % (07/27/20 0519) Vital Signs (72h Range):  Temp:  [97.8 °F (36.6 °C)-102.2 °F (39 °C)]   Pulse:  []   Resp:  [14-24]   BP: (108-168)/(63-88)   SpO2:  [94 %-100 %]      Recent Labs   Lab 07/25/20  0528 07/25/20 1807 07/26/20 0520   CREATININE 3.4* 5.0* 6.2*     Serum creatinine: 6.2 mg/dL (H) 07/26/20 0520  Estimated creatinine clearance: 21.2 mL/min (A)    Medication:meropenem dose: 1 gm frequency q8h will be changed to medication:meropenem dose:1 gm frequency:q12h    Pharmacist's Name: Bryson Troncoso  Pharmacist's Extension: [phone]

## 2020-07-27 NOTE — ASSESSMENT & PLAN NOTE
"Cr of 2.1 on arrival.  Reports chronic use of NSAID "all kinds" for pain   Appreciate Nephrology consult.  Probable ATN from infection and HERSON  Renal function continues to worsen.  Hopefully able to avoid HD.  Continue IVF  Vanc stopped.  Avoid nephrotoxic medication.              "

## 2020-07-27 NOTE — CONSULTS
[facility] Hospital Medicine  Telemedicine Consult Note       Unable to accept patient to virtual medicine at this time due to their necrotizing fascitis, POD1, requiring a daily physical exam.      Augustina Dodge MD  [facility] Medicine Staff

## 2020-07-27 NOTE — PROGRESS NOTES
[facility] Hospital Medicine  Progress Note    Patient Name: Chas Henao  MRN: [id number]  Patient Class: IP- Inpatient   Admission Date: 7/24/2020  Length of Stay: 2 days  Attending Physician: Obed Carroll MD  Primary Care Provider: Primary Doctor No        Subjective:     Principal Problem:Necrotizing fasciitis        HPI:  Chas Henao 35 y.o. male with type 1 diabetes, HTN, and HLD presents to the hospital with a chief complaint of leg pain. He reports 1 week ago he saw what he thought was a pimple to his left medial thigh that he attempted to express without success and the abscess expanded. Over the last week he has experienced an increasing constant throbbing pain without radiation improved with I&D of abscess in the ED and worsened with ambulation. He denies any trauma or injury to the leg. He has not lost control of his bowel/bladder. He denies fever, chest pain, SOB, N/V, abdominal pain, dysuria, dizziness, syncope.     In the ED, tachycardic to 105, WBC of 22, abscess seen on CT, lactic acid normal, Cr of 2.1.     Overview/Hospital Course:  Chas Henao 35 y.o. male placed in observation for left upper thigh abscess, NORAH and sepsis.  I&D in ED. Rocephin/Flagyl/doxy started on admission. Patient received CT with contrast on admission. General surgery and Nephrology consulted. US renal showed normal side kidneys, bilateral kidneys and bilateral elevated renal indices. 7/25 Underwent I & D of left upper thigh area that showed some necrotizing fasciitis.  Antibiotics switched to vancomycin and meropenem IV.  Renal function continued to worsen.  On 07/26 urine output approximately 500 mL over 24 hr. Fever subsided, but persistent leukocytosis. Nephrology felt ATN and continue IVF.  Vanc stopped and patient started on Ancef.    Interval History: Complaining of heartburn and indigestion.    Review of Systems   HENT: Negative for ear discharge and ear pain.    Eyes: Negative for  discharge and itching.   Endocrine: Negative for cold intolerance and heat intolerance.   Neurological: Negative for seizures and syncope.     Objective:     Vital Signs (Most Recent):  Temp: 97.9 °F (36.6 °C) (07/27/20 1135)  Pulse: (!) 119 (07/27/20 1135)  Resp: 17 (07/27/20 1159)  BP: 133/64 (07/27/20 1135)  SpO2: 99 % (07/27/20 1135) Vital Signs (24h Range):  Temp:  [97.8 °F (36.6 °C)-98.3 °F (36.8 °C)] 97.9 °F (36.6 °C)  Pulse:  [103-128] 119  Resp:  [17-19] 17  SpO2:  [95 %-99 %] 99 %  BP: (133-170)/(64-89) 133/64     Weight: 102.1 kg (225 lb)  Body mass index is 28.89 kg/m².    Intake/Output Summary (Last 24 hours) at 7/27/2020 1303  Last data filed at 7/26/2020 1830  Gross per 24 hour   Intake 1250 ml   Output 325 ml   Net 925 ml      Physical Exam  Vitals signs and nursing note reviewed.   Constitutional:       General: He is not in acute distress.     Appearance: He is well-developed.   HENT:      Head: Normocephalic and atraumatic.      Right Ear: External ear normal.      Left Ear: External ear normal.   Eyes:      General:         Right eye: No discharge.         Left eye: No discharge.      Conjunctiva/sclera: Conjunctivae normal.   Neck:      Musculoskeletal: Normal range of motion.      Thyroid: No thyromegaly.   Cardiovascular:      Rate and Rhythm: Normal rate and regular rhythm.      Heart sounds: No murmur.   Pulmonary:      Effort: Pulmonary effort is normal. No respiratory distress.      Breath sounds: Normal breath sounds.   Abdominal:      General: Bowel sounds are normal. There is no distension.      Palpations: Abdomen is soft. There is no mass.      Tenderness: There is no abdominal tenderness.   Musculoskeletal:         General: No deformity.   Skin:     General: Skin is warm and dry.      Comments: Wound packing noted with serosang material on guaze.  Tender to touch and mild surrounding induration   Neurological:      Mental Status: He is alert and oriented to person, place, and time.  "  Psychiatric:         Behavior: Behavior normal.         Significant Labs:   BMP:   Recent Labs   Lab 07/27/20  0449   *   *   K 3.5   CL 95   CO2 15*   BUN 68*   CREATININE 7.7*   CALCIUM 7.6*     CBC:   Recent Labs   Lab 07/26/20  0520 07/27/20  0448   WBC 26.45* 24.94*   HGB 9.5* 9.5*   HCT 28.2* 28.1*    263       Significant Imaging: I have reviewed all pertinent imaging results/findings within the past 24 hours.      Assessment/Plan:      * Necrotizing fasciitis  -Patient admitted to hospital for left upper thigh abscess  -CT with "Findings suggesting cellulitis involving the posteromedial proximal thigh soft tissues, with subcutaneous strand-like edema, extending to the fascial surface.  There is punctate subcutaneous emphysema within the superficial subcutaneous fat, adjacent to a small amount of disorganized fluid.  No discrete formed abscess at this time, however developing abscess is a consideration versus previously drained abscess."  - on admit Started on Rocephin/Doxy/Flagyl as CKD and received IV contrast  - 7/25 switched to Vanc and meropenem-Pharmacy following ; ID consulted  - 7/25 I&D of left upper thigh showed some necrotizing fascitis that was minimal with viable muscle  Afebrile, but persistent leukocytosis.  Wound Cx growing Staph.  Worsening renal failure.  Vanc stopped and ABx's switched to Ancef.  Surgery continuing to monitor.      Tobacco user  Smokes 1/5 pack a week. Encourage smoking cessation. Declines nicotine patch. Not craving.      Anemia due to chronic kidney disease  H/H stable over past few days.  No evidence of bleeding.      Sepsis  Secondary to above.  Sepsis secondary to Staph    NORAH (acute kidney injury)  Cr of 2.1 on arrival.  Reports chronic use of NSAID "all kinds" for pain   Appreciate Nephrology consult.  Probable ATN from infection and HERSON  Renal function continues to worsen.  Hopefully able to avoid HD.  Continue IVF  Vanc stopped.  Avoid " nephrotoxic medication.                Diabetes mellitus type 1  Uncontrolled with hyperglycemia.  hgbA1c 12.4  Diabetic diet and insulin sliding scale.  Continue current management and adjust scheduled insulin as needed.      HLD (hyperlipidemia)  Continue home statin    Essential hypertension  Holding home losartan with ARF.  Continue current management.  Continue hydralazine prn        VTE Risk Mitigation (From admission, onward)         Ordered     heparin (porcine) injection 5,000 Units  Every 8 hours      07/26/20 1313     Place CHRIS hose  Until discontinued      07/24/20 1843     IP VTE HIGH RISK PATIENT  Once      07/24/20 1801     Place sequential compression device  Until discontinued      07/24/20 1801                      Obed Carroll MD  Department of Hospital Medicine   [facility]

## 2020-07-27 NOTE — PLAN OF CARE
Problem: Fall Injury Risk  Goal: Absence of Fall and Fall-Related Injury  Outcome: Ongoing, Progressing     Problem: Adult Inpatient Plan of Care  Goal: Plan of Care Review  Outcome: Ongoing, Progressing     Problem: Adult Inpatient Plan of Care  Goal: Absence of Hospital-Acquired Illness or Injury  Outcome: Ongoing, Progressing     Problem: Adult Inpatient Plan of Care  Goal: Readiness for Transition of Care  Outcome: Ongoing, Progressing     Problem: Adult Inpatient Plan of Care  Goal: Rounds/Family Conference  Outcome: Ongoing, Progressing

## 2020-07-27 NOTE — ASSESSMENT & PLAN NOTE
Uncontrolled with hyperglycemia.  hgbA1c 12.4  Diabetic diet and insulin sliding scale.  Continue current management and adjust scheduled insulin as needed.

## 2020-07-27 NOTE — ASSESSMENT & PLAN NOTE
"-Patient admitted to hospital for left upper thigh abscess  -CT with "Findings suggesting cellulitis involving the posteromedial proximal thigh soft tissues, with subcutaneous strand-like edema, extending to the fascial surface.  There is punctate subcutaneous emphysema within the superficial subcutaneous fat, adjacent to a small amount of disorganized fluid.  No discrete formed abscess at this time, however developing abscess is a consideration versus previously drained abscess."  - on admit Started on Rocephin/Doxy/Flagyl as CKD and received IV contrast  - 7/25 switched to Vanc and meropenem-Pharmacy following ; ID consulted  - 7/25 I&D of left upper thigh showed some necrotizing fascitis that was minimal with viable muscle  Afebrile, but persistent leukocytosis.  Wound Cx growing Staph.  Worsening renal failure.  Vanc stopped and ABx's switched to Ancef.  Surgery continuing to monitor.    "

## 2020-07-27 NOTE — NURSING
MEWS Monitoring: Chart check completed, abnormal VS noted, MEWS 96% RA, RR 18, , B/P 134/76, T 98.3. bedside RN, Rosalie contacted, reports the patient is having pain which she is about to treat.  no concerns verbalized at this time, instructed to call [phone] for further concerns or assistance..

## 2020-07-27 NOTE — SUBJECTIVE & OBJECTIVE
Review of Systems   HENT: Negative for ear discharge and ear pain.    Eyes: Negative for discharge and itching.   Respiratory: Negative for shortness of breath.    Cardiovascular: Negative for chest pain.   Gastrointestinal: Positive for nausea and vomiting. Negative for diarrhea.   Endocrine: Negative for cold intolerance and heat intolerance.   Genitourinary: Negative for dysuria, frequency and urgency.   Skin: Negative for wound.   Neurological: Negative for seizures and syncope.     Objective:     Vital Signs (Most Recent):  Temp: 97.9 °F (36.6 °C) (07/27/20 1135)  Pulse: (!) 119 (07/27/20 1135)  Resp: 17 (07/27/20 1159)  BP: 133/64 (07/27/20 1135)  SpO2: 99 % (07/27/20 1135) Vital Signs (24h Range):  Temp:  [97.8 °F (36.6 °C)-98.3 °F (36.8 °C)] 97.9 °F (36.6 °C)  Pulse:  [103-128] 119  Resp:  [17-19] 17  SpO2:  [95 %-99 %] 99 %  BP: (133-170)/(64-89) 133/64     Weight: 102.1 kg (225 lb)  Body mass index is 28.89 kg/m².    Intake/Output Summary (Last 24 hours) at 7/27/2020 1331  Last data filed at 7/26/2020 1830  Gross per 24 hour   Intake 1250 ml   Output 325 ml   Net 925 ml      Physical Exam  Vitals signs and nursing note reviewed.   Constitutional:       General: He is not in acute distress.     Appearance: He is well-developed.   HENT:      Head: Normocephalic and atraumatic.      Right Ear: External ear normal.      Left Ear: External ear normal.   Eyes:      General:         Right eye: No discharge.         Left eye: No discharge.      Conjunctiva/sclera: Conjunctivae normal.   Neck:      Musculoskeletal: Normal range of motion.      Thyroid: No thyromegaly.   Cardiovascular:      Rate and Rhythm: Normal rate and regular rhythm.      Heart sounds: No murmur.   Pulmonary:      Effort: Pulmonary effort is normal. No respiratory distress.      Breath sounds: Normal breath sounds.   Abdominal:      General: Bowel sounds are normal. There is no distension.      Palpations: Abdomen is soft. There is no mass.       Tenderness: There is no abdominal tenderness.   Musculoskeletal:         General: No deformity.   Skin:     General: Skin is warm and dry.      Comments: Wound packing noted with serosang material on guaze.  Tender to touch and mild surrounding induration   Neurological:      Mental Status: He is alert and oriented to person, place, and time.   Psychiatric:         Behavior: Behavior normal.         Significant Labs:   BMP:   Recent Labs   Lab 07/27/20  0449   *   *   K 3.5   CL 95   CO2 15*   BUN 68*   CREATININE 7.7*   CALCIUM 7.6*     CBC:   Recent Labs   Lab 07/26/20  0520 07/27/20  0448   WBC 26.45* 24.94*   HGB 9.5* 9.5*   HCT 28.2* 28.1*    263       Significant Imaging: I have reviewed all pertinent imaging results/findings within the past 24 hours.

## 2020-07-27 NOTE — SUBJECTIVE & OBJECTIVE
Interval History: No new complaints.    Review of Systems   HENT: Negative for ear discharge and ear pain.    Eyes: Negative for discharge and itching.   Endocrine: Negative for cold intolerance and heat intolerance.   Neurological: Negative for seizures and syncope.     Objective:     Vital Signs (Most Recent):  Temp: 97.9 °F (36.6 °C) (07/27/20 1135)  Pulse: (!) 119 (07/27/20 1135)  Resp: 17 (07/27/20 1159)  BP: 133/64 (07/27/20 1135)  SpO2: 99 % (07/27/20 1135) Vital Signs (24h Range):  Temp:  [97.8 °F (36.6 °C)-98.3 °F (36.8 °C)] 97.9 °F (36.6 °C)  Pulse:  [103-128] 119  Resp:  [17-19] 17  SpO2:  [95 %-99 %] 99 %  BP: (133-170)/(64-89) 133/64     Weight: 102.1 kg (225 lb)  Body mass index is 28.89 kg/m².    Intake/Output Summary (Last 24 hours) at 7/27/2020 1303  Last data filed at 7/26/2020 1830  Gross per 24 hour   Intake 1250 ml   Output 325 ml   Net 925 ml      Physical Exam  Vitals signs and nursing note reviewed.   Constitutional:       General: He is not in acute distress.     Appearance: He is well-developed.   HENT:      Head: Normocephalic and atraumatic.      Right Ear: External ear normal.      Left Ear: External ear normal.   Eyes:      General:         Right eye: No discharge.         Left eye: No discharge.      Conjunctiva/sclera: Conjunctivae normal.   Neck:      Musculoskeletal: Normal range of motion.      Thyroid: No thyromegaly.   Cardiovascular:      Rate and Rhythm: Normal rate and regular rhythm.      Heart sounds: No murmur.   Pulmonary:      Effort: Pulmonary effort is normal. No respiratory distress.      Breath sounds: Normal breath sounds.   Abdominal:      General: Bowel sounds are normal. There is no distension.      Palpations: Abdomen is soft. There is no mass.      Tenderness: There is no abdominal tenderness.   Musculoskeletal:         General: No deformity.   Skin:     General: Skin is warm and dry.      Comments: Wound packing noted with serosang material on guaze.  Tender to  touch and mild surrounding induration   Neurological:      Mental Status: He is alert and oriented to person, place, and time.   Psychiatric:         Behavior: Behavior normal.         Significant Labs:   BMP:   Recent Labs   Lab 07/27/20  0449   *   *   K 3.5   CL 95   CO2 15*   BUN 68*   CREATININE 7.7*   CALCIUM 7.6*     CBC:   Recent Labs   Lab 07/26/20  0520 07/27/20  0448   WBC 26.45* 24.94*   HGB 9.5* 9.5*   HCT 28.2* 28.1*    263       Significant Imaging: I have reviewed all pertinent imaging results/findings within the past 24 hours.

## 2020-07-27 NOTE — ANESTHESIA POSTPROCEDURE EVALUATION
Anesthesia Post Evaluation    Patient: Chas Henao    Procedure(s) Performed: Procedure(s) (LRB):  INCISION AND DRAINAGE (Left)    Final Anesthesia Type: general    Patient location during evaluation: PACU  Patient participation: Yes- Able to Participate  Level of consciousness: awake and alert, oriented and awake  Post-procedure vital signs: reviewed and stable  Pain management: adequate  Airway patency: patent    PONV status at discharge: No PONV  Anesthetic complications: no      Cardiovascular status: blood pressure returned to baseline, hemodynamically stable and stable  Respiratory status: unassisted and spontaneous ventilation  Hydration status: euvolemic  Follow-up not needed.          Vitals Value Taken Time   /64 07/27/20 1135   Temp 36.6 °C (97.9 °F) 07/27/20 1135   Pulse 119 07/27/20 1135   Resp 17 07/27/20 1159   SpO2 99 % 07/27/20 1135         Event Time   Out of Recovery 07/25/2020 14:58:00         Pain/Yesenia Score: Pain Rating Prior to Med Admin: 6 (7/27/2020 11:59 AM)  Pain Rating Post Med Admin: 4 (7/27/2020  3:15 AM)

## 2020-07-27 NOTE — PROGRESS NOTES
Awake alert oriented NAD    Denies CNS ENT CP GI  RHEUM OR DERM SX  Past Medical History:   Diagnosis Date    Diabetes mellitus     Hypertension      Review of patient's allergies indicates:  No Known Allergies    Current Facility-Administered Medications   Medication    0.45% NaCl infusion    acetaminophen tablet 650 mg    atorvastatin tablet 20 mg    ceFAZolin (ANCEF) 6 g in dextrose 5 % 500 mL CONTINUOUS INFUSION    dextrose 50% injection 12.5 g    dextrose 50% injection 25 g    diphenhydrAMINE capsule 25 mg    famotidine tablet 20 mg    glucagon (human recombinant) injection 1 mg    glucose chewable tablet 16 g    glucose chewable tablet 24 g    heparin (porcine) injection 5,000 Units    hydrALAZINE injection 10 mg    insulin aspart U-100 pen 0-5 Units    insulin aspart U-100 pen 10 Units    insulin detemir U-100 pen 25 Units    ondansetron injection 4 mg    oxyCODONE-acetaminophen 5-325 mg per tablet 1 tablet    pantoprazole EC tablet 40 mg    promethazine (PHENERGAN) 6.25 mg in dextrose 5 % 50 mL IVPB    senna-docusate 8.6-50 mg per tablet 1 tablet    sodium chloride 0.9% flush 10 mL    sodium chloride 0.9% flush 10 mL       LABS    Recent Results (from the past 24 hour(s))   POCT glucose    Collection Time: 07/26/20  5:38 PM   Result Value Ref Range    POCT Glucose 261 (H) 70 - 110 mg/dL   POCT glucose    Collection Time: 07/26/20  9:01 PM   Result Value Ref Range    POCT Glucose 191 (H) 70 - 110 mg/dL   Vancomycin, random    Collection Time: 07/27/20  4:48 AM   Result Value Ref Range    Vancomycin, Random 24.7 Not established ug/mL   CBC auto differential    Collection Time: 07/27/20  4:48 AM   Result Value Ref Range    WBC 24.94 (H) 3.90 - 12.70 K/uL    RBC 3.34 (L) 4.60 - 6.20 M/uL    Hemoglobin 9.5 (L) 14.0 - 18.0 g/dL    Hematocrit 28.1 (L) 40.0 - 54.0 %    Mean Corpuscular Volume 84 82 - 98 fL    Mean Corpuscular Hemoglobin 28.4 27.0 - 31.0 pg    Mean Corpuscular Hemoglobin  Conc 33.8 32.0 - 36.0 g/dL    RDW 12.7 11.5 - 14.5 %    Platelets 263 150 - 350 K/uL    MPV 11.0 9.2 - 12.9 fL    Immature Granulocytes 2.8 (H) 0.0 - 0.5 %    Gran # (ANC) 20.3 (H) 1.8 - 7.7 K/uL    Immature Grans (Abs) 0.69 (H) 0.00 - 0.04 K/uL    Lymph # 2.3 1.0 - 4.8 K/uL    Mono # 1.5 (H) 0.3 - 1.0 K/uL    Eos # 0.1 0.0 - 0.5 K/uL    Baso # 0.05 0.00 - 0.20 K/uL    nRBC 0 0 /100 WBC    Gran% 81.2 (H) 38.0 - 73.0 %    Lymph% 9.4 (L) 18.0 - 48.0 %    Mono% 6.1 4.0 - 15.0 %    Eosinophil% 0.3 0.0 - 8.0 %    Basophil% 0.2 0.0 - 1.9 %    Toxic Granulation Present     Differential Method Automated    Comprehensive metabolic panel    Collection Time: 07/27/20  4:49 AM   Result Value Ref Range    Sodium 127 (L) 136 - 145 mmol/L    Potassium 3.5 3.5 - 5.1 mmol/L    Chloride 95 95 - 110 mmol/L    CO2 15 (L) 23 - 29 mmol/L    Glucose 227 (H) 70 - 110 mg/dL    BUN, Bld 68 (H) 6 - 20 mg/dL    Creatinine 7.7 (H) 0.5 - 1.4 mg/dL    Calcium 7.6 (L) 8.7 - 10.5 mg/dL    Total Protein 5.8 (L) 6.0 - 8.4 g/dL    Albumin 1.4 (L) 3.5 - 5.2 g/dL    Total Bilirubin 0.1 0.1 - 1.0 mg/dL    Alkaline Phosphatase 122 55 - 135 U/L    AST 14 10 - 40 U/L    ALT 11 10 - 44 U/L    Anion Gap 17 (H) 8 - 16 mmol/L    eGFR if African American 10 (A) >60 mL/min/1.73 m^2    eGFR if non African American 8 (A) >60 mL/min/1.73 m^2   POCT glucose    Collection Time: 07/27/20  7:26 AM   Result Value Ref Range    POCT Glucose 276 (H) 70 - 110 mg/dL   POCT glucose    Collection Time: 07/27/20 11:30 AM   Result Value Ref Range    POCT Glucose 283 (H) 70 - 110 mg/dL   ]    I/O last 3 completed shifts:  In: 2814.6 [I.V.:2664.6; IV Piggyback:150]  Out: 525 [Urine:525]    Vitals:    07/27/20 0620 07/27/20 0730 07/27/20 1135 07/27/20 1159   BP:  (!) 170/89 133/64    Pulse:  (!) 128 (!) 119    Resp: 19 19 19 17   Temp:  98.3 °F (36.8 °C) 97.9 °F (36.6 °C)    TempSrc:  Oral Oral    SpO2:  98% 99%    Weight:       Height:           No Jvd, Thyromegaly or  Lymphadenopathy  Lungs: Fairly clear anteriorly and laterally  Cor: RRR no G or rubs  Abd: Soft benign good bowel sounds non tender  Ext: No E C C    A)    NORAH secondary to multiple causes: Out pt aggressive use of NSAIDs, infection, CT abd/pelvis with IV contrast, dm, htn  Creat continue rising poor uo  DM  HTN  L thigh infection    P)    Renal Diet  Home meds  HD might be needed  EPO might be needed  Binders might be needed  Adjust all meds to the degree of renal fx  Close follow up I/O and weights  Maintain Hydration

## 2020-07-27 NOTE — HPI
35M with T2DM, HTN, HLD admitted 7/24 with leg pain. He reports 1 week ago he saw what he thought was a pimple to his left medial thigh that he attempted to express without success and the area got bigger and more painful and came to the hospital. Denies trauma.      Ct of thigh with cellulitis to fascial surface, possible abscess.    Was taken for I&D of L thigh with general surgery    Reports leg feeling better, but says still feels tight. Having some drainage on surgical dressings. Report bad nausea. Denies diarrhea. Denies indwelling hardware.     Surgical cultures growing MSSA     Has received ceftriaxone, clindamycin, doxy, metronidazole, vanc, meropenem since admit    Fever resolved. Leukocytosis persistant. ID c/f abx recs

## 2020-07-27 NOTE — ASSESSMENT & PLAN NOTE
35M with T2DM, HTN, HLD admitted 7/24 with leg pain, swelling. Found to have thigh abscess. S/p I&D, no evidence of muscle necrosis. Cultures growing staph. Fevers resolved. Persistent leukocytosis fluctuance. Has received ceftriaxone, clindamycin, doxy, metronidazole, vanc, meropenem since admit. ID c/f abx recs     Recommendations:   - de-escalate to IV cefazolin while inpatient  - repeat CT to r/o remaining abscess  - plan to de-escalate to po cefadroxil x 14 days from date of I&D

## 2020-07-27 NOTE — PROGRESS NOTES
"gen surg pod 2  Still w pain at sx site  Wbc down to 24  Cr up to 7.7  Intra op cx ngtd  Blood pressure 134/76, pulse 103, temperature 98.3 °F (36.8 °C), temperature source Oral, resp. rate 19, height 6' 2" (1.88 m), weight 102.1 kg (225 lb), SpO2 96 %.  L post inner thigh wound packed open, min serosang dc on dressing, no purulence crepitance or fluctuance, mild surrounding induration  A/p[ s/p I&D soft tissue infection L thigh- await cx, cont abx and local care, renal function worse, appears completely drained/debrided on exam,if pain does not start to improve/wbc does not cont to fall may reimage to look for any deeper infection but none seen on orig imaging in ER   "

## 2020-07-27 NOTE — PLAN OF CARE
Problem: Fall Injury Risk  Goal: Absence of Fall and Fall-Related Injury  Outcome: Ongoing, Progressing     Problem: Adult Inpatient Plan of Care  Goal: Plan of Care Review  Outcome: Ongoing, Progressing  Goal: Patient-Specific Goal (Individualization)  Outcome: Ongoing, Progressing  Goal: Absence of Hospital-Acquired Illness or Injury  Outcome: Ongoing, Progressing  Goal: Optimal Comfort and Wellbeing  Outcome: Ongoing, Progressing  Goal: Readiness for Transition of Care  Outcome: Ongoing, Progressing  Goal: Rounds/Family Conference  Outcome: Ongoing, Progressing     Problem: Diabetes Comorbidity  Goal: Blood Glucose Level Within Desired Range  Outcome: Ongoing, Progressing     Problem: Adjustment to Illness (Sepsis/Septic Shock)  Goal: Optimal Coping  Outcome: Ongoing, Progressing     Problem: Bleeding (Sepsis/Septic Shock)  Goal: Absence of Bleeding  Outcome: Ongoing, Progressing     Problem: Glycemic Control Impaired (Sepsis/Septic Shock)  Goal: Blood Glucose Level Within Desired Range  Outcome: Ongoing, Progressing     Problem: Hemodynamic Instability (Sepsis/Septic Shock)  Goal: Effective Tissue Perfusion  Outcome: Ongoing, Progressing     Problem: Infection (Sepsis/Septic Shock)  Goal: Absence of Infection Signs/Symptoms  Outcome: Ongoing, Progressing     Problem: Nutrition Impaired (Sepsis/Septic Shock)  Goal: Optimal Nutrition Intake  Outcome: Ongoing, Progressing     Problem: Respiratory Compromise (Sepsis/Septic Shock)  Goal: Effective Oxygenation and Ventilation  Outcome: Ongoing, Progressing     Problem: Electrolyte Imbalance (Acute Kidney Injury/Impairment)  Goal: Serum Electrolyte Balance  Outcome: Ongoing, Progressing     Problem: Fluid Imbalance (Acute Kidney Injury/Impairment)  Goal: Optimal Fluid Balance  Outcome: Ongoing, Progressing     Problem: Hematologic Alteration (Acute Kidney Injury/Impairment)  Goal: Hemoglobin, Hematocrit and Platelets Within Normal Range  Outcome: Ongoing,  Progressing     Problem: Oral Intake Inadequate (Acute Kidney Injury/Impairment)  Goal: Optimal Nutrition Intake  Outcome: Ongoing, Progressing     Problem: Renal Function Impairment (Acute Kidney Injury/Impairment)  Goal: Effective Renal Function  Outcome: Ongoing, Progressing     Problem: Infection  Goal: Infection Symptom Resolution  Outcome: Ongoing, Progressing

## 2020-07-28 LAB
ANA SER QL IF: NORMAL
ANION GAP SERPL CALC-SCNC: 15 MMOL/L (ref 8–16)
BACTERIA SPEC AEROBE CULT: ABNORMAL
BASOPHILS # BLD AUTO: 0.06 K/UL (ref 0–0.2)
BASOPHILS NFR BLD: 0.3 % (ref 0–1.9)
BUN SERPL-MCNC: 67 MG/DL (ref 6–20)
CALCIUM SERPL-MCNC: 7.9 MG/DL (ref 8.7–10.5)
CHLORIDE SERPL-SCNC: 98 MMOL/L (ref 95–110)
CO2 SERPL-SCNC: 15 MMOL/L (ref 23–29)
CREAT SERPL-MCNC: 7.9 MG/DL (ref 0.5–1.4)
DIFFERENTIAL METHOD: ABNORMAL
EOSINOPHIL # BLD AUTO: 0.2 K/UL (ref 0–0.5)
EOSINOPHIL NFR BLD: 1.1 % (ref 0–8)
ERYTHROCYTE [DISTWIDTH] IN BLOOD BY AUTOMATED COUNT: 12.7 % (ref 11.5–14.5)
EST. GFR  (AFRICAN AMERICAN): 9 ML/MIN/1.73 M^2
EST. GFR  (NON AFRICAN AMERICAN): 8 ML/MIN/1.73 M^2
FINAL PATHOLOGIC DIAGNOSIS: NORMAL
GLUCOSE SERPL-MCNC: 174 MG/DL (ref 70–110)
GROSS: NORMAL
HCT VFR BLD AUTO: 27.8 % (ref 40–54)
HGB BLD-MCNC: 9.6 G/DL (ref 14–18)
IMM GRANULOCYTES # BLD AUTO: 0.39 K/UL (ref 0–0.04)
IMM GRANULOCYTES NFR BLD AUTO: 2.1 % (ref 0–0.5)
LYMPHOCYTES # BLD AUTO: 3 K/UL (ref 1–4.8)
LYMPHOCYTES NFR BLD: 16 % (ref 18–48)
MCH RBC QN AUTO: 28.2 PG (ref 27–31)
MCHC RBC AUTO-ENTMCNC: 34.5 G/DL (ref 32–36)
MCV RBC AUTO: 82 FL (ref 82–98)
MONOCYTES # BLD AUTO: 1.3 K/UL (ref 0.3–1)
MONOCYTES NFR BLD: 7.2 % (ref 4–15)
NEUTROPHILS # BLD AUTO: 13.6 K/UL (ref 1.8–7.7)
NEUTROPHILS NFR BLD: 73.3 % (ref 38–73)
NRBC BLD-RTO: 0 /100 WBC
PLATELET # BLD AUTO: 320 K/UL (ref 150–350)
PMV BLD AUTO: 10.5 FL (ref 9.2–12.9)
POCT GLUCOSE: 168 MG/DL (ref 70–110)
POCT GLUCOSE: 192 MG/DL (ref 70–110)
POCT GLUCOSE: 192 MG/DL (ref 70–110)
POCT GLUCOSE: 214 MG/DL (ref 70–110)
POTASSIUM SERPL-SCNC: 3.2 MMOL/L (ref 3.5–5.1)
RBC # BLD AUTO: 3.4 M/UL (ref 4.6–6.2)
SODIUM SERPL-SCNC: 128 MMOL/L (ref 136–145)
WBC # BLD AUTO: 18.54 K/UL (ref 3.9–12.7)

## 2020-07-28 PROCEDURE — 25000003 PHARM REV CODE 250: Performed by: HOSPITALIST

## 2020-07-28 PROCEDURE — 80048 BASIC METABOLIC PNL TOTAL CA: CPT

## 2020-07-28 PROCEDURE — 85025 COMPLETE CBC W/AUTO DIFF WBC: CPT

## 2020-07-28 PROCEDURE — 25000003 PHARM REV CODE 250: Performed by: INTERNAL MEDICINE

## 2020-07-28 PROCEDURE — 99233 SBSQ HOSP IP/OBS HIGH 50: CPT | Mod: ,,, | Performed by: INTERNAL MEDICINE

## 2020-07-28 PROCEDURE — 63600175 PHARM REV CODE 636 W HCPCS: Performed by: NURSE PRACTITIONER

## 2020-07-28 PROCEDURE — 63600175 PHARM REV CODE 636 W HCPCS: Performed by: SURGERY

## 2020-07-28 PROCEDURE — 11000001 HC ACUTE MED/SURG PRIVATE ROOM

## 2020-07-28 PROCEDURE — 63600175 PHARM REV CODE 636 W HCPCS: Performed by: INTERNAL MEDICINE

## 2020-07-28 PROCEDURE — 94761 N-INVAS EAR/PLS OXIMETRY MLT: CPT

## 2020-07-28 PROCEDURE — 87205 SMEAR GRAM STAIN: CPT

## 2020-07-28 PROCEDURE — 99233 PR SUBSEQUENT HOSPITAL CARE,LEVL III: ICD-10-PCS | Mod: ,,, | Performed by: INTERNAL MEDICINE

## 2020-07-28 PROCEDURE — 25000003 PHARM REV CODE 250: Performed by: SURGERY

## 2020-07-28 PROCEDURE — 36415 COLL VENOUS BLD VENIPUNCTURE: CPT

## 2020-07-28 RX ORDER — AMLODIPINE BESYLATE 5 MG/1
5 TABLET ORAL DAILY
Status: DISCONTINUED | OUTPATIENT
Start: 2020-07-28 | End: 2020-07-29

## 2020-07-28 RX ORDER — CEFAZOLIN SODIUM 1 G/50ML
1 SOLUTION INTRAVENOUS
Status: DISCONTINUED | OUTPATIENT
Start: 2020-07-28 | End: 2020-07-31 | Stop reason: HOSPADM

## 2020-07-28 RX ADMIN — SODIUM CHLORIDE: 0.45 INJECTION, SOLUTION INTRAVENOUS at 02:07

## 2020-07-28 RX ADMIN — HEPARIN SODIUM 5000 UNITS: 5000 INJECTION INTRAVENOUS; SUBCUTANEOUS at 05:07

## 2020-07-28 RX ADMIN — DEXTROSE 6 G: 5 SOLUTION INTRAVENOUS at 11:07

## 2020-07-28 RX ADMIN — INSULIN ASPART 10 UNITS: 100 INJECTION, SOLUTION INTRAVENOUS; SUBCUTANEOUS at 12:07

## 2020-07-28 RX ADMIN — OXYCODONE HYDROCHLORIDE AND ACETAMINOPHEN 1 TABLET: 5; 325 TABLET ORAL at 10:07

## 2020-07-28 RX ADMIN — OXYCODONE HYDROCHLORIDE AND ACETAMINOPHEN 1 TABLET: 5; 325 TABLET ORAL at 03:07

## 2020-07-28 RX ADMIN — INSULIN ASPART 10 UNITS: 100 INJECTION, SOLUTION INTRAVENOUS; SUBCUTANEOUS at 05:07

## 2020-07-28 RX ADMIN — OXYCODONE HYDROCHLORIDE AND ACETAMINOPHEN 1 TABLET: 5; 325 TABLET ORAL at 05:07

## 2020-07-28 RX ADMIN — SODIUM CHLORIDE: 0.45 INJECTION, SOLUTION INTRAVENOUS at 03:07

## 2020-07-28 RX ADMIN — CEFAZOLIN SODIUM 1 G: 1 SOLUTION INTRAVENOUS at 11:07

## 2020-07-28 RX ADMIN — HEPARIN SODIUM 5000 UNITS: 5000 INJECTION INTRAVENOUS; SUBCUTANEOUS at 10:07

## 2020-07-28 RX ADMIN — INSULIN ASPART 2 UNITS: 100 INJECTION, SOLUTION INTRAVENOUS; SUBCUTANEOUS at 08:07

## 2020-07-28 RX ADMIN — AMLODIPINE BESYLATE 5 MG: 5 TABLET ORAL at 11:07

## 2020-07-28 RX ADMIN — ONDANSETRON HYDROCHLORIDE 4 MG: 2 SOLUTION INTRAMUSCULAR; INTRAVENOUS at 01:07

## 2020-07-28 RX ADMIN — INSULIN ASPART 10 UNITS: 100 INJECTION, SOLUTION INTRAVENOUS; SUBCUTANEOUS at 08:07

## 2020-07-28 RX ADMIN — ATORVASTATIN CALCIUM 20 MG: 10 TABLET, FILM COATED ORAL at 08:07

## 2020-07-28 RX ADMIN — HEPARIN SODIUM 5000 UNITS: 5000 INJECTION INTRAVENOUS; SUBCUTANEOUS at 01:07

## 2020-07-28 RX ADMIN — OXYCODONE HYDROCHLORIDE AND ACETAMINOPHEN 1 TABLET: 5; 325 TABLET ORAL at 08:07

## 2020-07-28 RX ADMIN — OXYCODONE HYDROCHLORIDE AND ACETAMINOPHEN 1 TABLET: 5; 325 TABLET ORAL at 12:07

## 2020-07-28 RX ADMIN — FAMOTIDINE 20 MG: 20 TABLET ORAL at 03:07

## 2020-07-28 RX ADMIN — PANTOPRAZOLE SODIUM 40 MG: 40 TABLET, DELAYED RELEASE ORAL at 08:07

## 2020-07-28 RX ADMIN — HYDRALAZINE HYDROCHLORIDE 10 MG: 20 INJECTION INTRAMUSCULAR; INTRAVENOUS at 12:07

## 2020-07-28 RX ADMIN — INSULIN DETEMIR 25 UNITS: 100 INJECTION, SOLUTION SUBCUTANEOUS at 08:07

## 2020-07-28 NOTE — SUBJECTIVE & OBJECTIVE
Interval history: NAEO. Had ct scan this am, no abscess. Pt reports nausea improved. Tearful about possiblity of needing dialysis    Review of Systems   HENT: Negative for ear discharge and ear pain.    Eyes: Negative for discharge and itching.   Respiratory: Negative for shortness of breath.    Cardiovascular: Negative for chest pain.   Gastrointestinal: Positive for nausea and vomiting. Negative for diarrhea.   Endocrine: Negative for cold intolerance and heat intolerance.   Genitourinary: Negative for dysuria, frequency and urgency.   Skin: Negative for wound.   Neurological: Negative for seizures and syncope.     Objective:     Vital Signs (Most Recent):  Temp: 97.6 °F (36.4 °C) (07/28/20 1126)  Pulse: 92 (07/28/20 1330)  Resp: 17 (07/28/20 1254)  BP: 126/64 (07/28/20 1330)  SpO2: 95 % (07/28/20 1126) Vital Signs (24h Range):  Temp:  [97.6 °F (36.4 °C)-98.1 °F (36.7 °C)] 97.6 °F (36.4 °C)  Pulse:  [] 92  Resp:  [17-19] 17  SpO2:  [95 %-100 %] 95 %  BP: (126-193)/(62-93) 126/64     Weight: 102.1 kg (225 lb)  Body mass index is 28.89 kg/m².    Intake/Output Summary (Last 24 hours) at 7/28/2020 1431  Last data filed at 7/28/2020 0900  Gross per 24 hour   Intake 3175 ml   Output --   Net 3175 ml      Physical Exam  Vitals signs and nursing note reviewed.   Constitutional:       General: He is not in acute distress.     Appearance: He is well-developed.   HENT:      Head: Normocephalic and atraumatic.      Right Ear: External ear normal.      Left Ear: External ear normal.   Eyes:      General:         Right eye: No discharge.         Left eye: No discharge.      Conjunctiva/sclera: Conjunctivae normal.   Neck:      Musculoskeletal: Normal range of motion.      Thyroid: No thyromegaly.   Cardiovascular:      Rate and Rhythm: Normal rate and regular rhythm.      Heart sounds: No murmur.   Pulmonary:      Effort: Pulmonary effort is normal. No respiratory distress.      Breath sounds: Normal breath sounds.    Abdominal:      General: Bowel sounds are normal. There is no distension.      Palpations: Abdomen is soft. There is no mass.      Tenderness: There is no abdominal tenderness.   Musculoskeletal:         General: No deformity.   Skin:     General: Skin is warm and dry.      Comments: Wound packing noted with serosang material on guaze.  Tender to touch and mild surrounding induration   Neurological:      Mental Status: He is alert and oriented to person, place, and time.   Psychiatric:         Behavior: Behavior normal.         Significant Labs:   BMP:   Recent Labs   Lab 07/28/20  0509   *   *   K 3.2*   CL 98   CO2 15*   BUN 67*   CREATININE 7.9*   CALCIUM 7.9*     CBC:   Recent Labs   Lab 07/27/20  0448 07/28/20  0509   WBC 24.94* 18.54*   HGB 9.5* 9.6*   HCT 28.1* 27.8*    320       Significant Imaging: I have reviewed all pertinent imaging results/findings within the past 24 hours.

## 2020-07-28 NOTE — CONSULTS
[facility]  Infectious Disease  Consult Note    Patient Name: Chas Henao  MRN: [id number]  Admission Date: 7/24/2020  Hospital Length of Stay: 3 days  Attending Physician: Obed Carroll MD  Primary Care Provider: Primary Doctor No     Isolation Status: No active isolations    Patient information was obtained from patient and ER records.      Consults  Assessment/Plan:     MSSA (methicillin susceptible Staphylococcus aureus)  35M with T2DM, HTN, HLD admitted 7/24 with leg pain, swelling. Found to have thigh abscess. S/p I&D, no evidence of muscle necrosis. Cultures growing staph and group B strep. Fevers resolved. Leukocytosis downtrending. No abscess on f/u CT scan. ID c/f abx recs     Recommendations:   - continue IV cefazolin while inpatient  - plan to de-escalate to renally dosed po cefadroxil x 14 days from date of I&D (est end date 8/7/20). If crcl 10-25ml/min, this would be 500mg cefadroxil daily  - please notify ID with any growth in new culture                  Thank you for your consult. I will sign off. Please contact us if you have any additional questions.    Dhara Bañuelos MD  Infectious Disease  [facility]    Subjective:     Principal Problem: Necrotizing fasciitis    HPI: 35M with T2DM, HTN, HLD admitted 7/24 with leg pain. He reports 1 week ago he saw what he thought was a pimple to his left medial thigh that he attempted to express without success and the area got bigger and more painful and came to the hospital. Denies trauma.      Ct of thigh with cellulitis to fascial surface, possible abscess.    Was taken for I&D of L thigh with general surgery    Reports leg feeling better, but says still feels tight. Having some drainage on surgical dressings. Report bad nausea. Denies diarrhea. Denies indwelling hardware.     Surgical cultures growing MSSA     Has received ceftriaxone, clindamycin, doxy, metronidazole, vanc, meropenem since admit    Fever  resolved. Leukocytosis persistant. ID c/f abx recs             Interval history: NAEO. Had ct scan this am, no abscess. Pt reports nausea improved. Tearful about possiblity of needing dialysis    Review of Systems   HENT: Negative for ear discharge and ear pain.    Eyes: Negative for discharge and itching.   Respiratory: Negative for shortness of breath.    Cardiovascular: Negative for chest pain.   Gastrointestinal: Positive for nausea and vomiting. Negative for diarrhea.   Endocrine: Negative for cold intolerance and heat intolerance.   Genitourinary: Negative for dysuria, frequency and urgency.   Skin: Negative for wound.   Neurological: Negative for seizures and syncope.     Objective:     Vital Signs (Most Recent):  Temp: 97.6 °F (36.4 °C) (07/28/20 1126)  Pulse: 92 (07/28/20 1330)  Resp: 17 (07/28/20 1254)  BP: 126/64 (07/28/20 1330)  SpO2: 95 % (07/28/20 1126) Vital Signs (24h Range):  Temp:  [97.6 °F (36.4 °C)-98.1 °F (36.7 °C)] 97.6 °F (36.4 °C)  Pulse:  [] 92  Resp:  [17-19] 17  SpO2:  [95 %-100 %] 95 %  BP: (126-193)/(62-93) 126/64     Weight: 102.1 kg (225 lb)  Body mass index is 28.89 kg/m².    Intake/Output Summary (Last 24 hours) at 7/28/2020 1431  Last data filed at 7/28/2020 0900  Gross per 24 hour   Intake 3175 ml   Output --   Net 3175 ml      Physical Exam  Vitals signs and nursing note reviewed.   Constitutional:       General: He is not in acute distress.     Appearance: He is well-developed.   HENT:      Head: Normocephalic and atraumatic.      Right Ear: External ear normal.      Left Ear: External ear normal.   Eyes:      General:         Right eye: No discharge.         Left eye: No discharge.      Conjunctiva/sclera: Conjunctivae normal.   Neck:      Musculoskeletal: Normal range of motion.      Thyroid: No thyromegaly.   Cardiovascular:      Rate and Rhythm: Normal rate and regular rhythm.      Heart sounds: No murmur.   Pulmonary:      Effort: Pulmonary effort is normal. No  respiratory distress.      Breath sounds: Normal breath sounds.   Abdominal:      General: Bowel sounds are normal. There is no distension.      Palpations: Abdomen is soft. There is no mass.      Tenderness: There is no abdominal tenderness.   Musculoskeletal:         General: No deformity.   Skin:     General: Skin is warm and dry.      Comments: Wound packing noted with serosang material on guaze.  Tender to touch and mild surrounding induration   Neurological:      Mental Status: He is alert and oriented to person, place, and time.   Psychiatric:         Behavior: Behavior normal.         Significant Labs:   BMP:   Recent Labs   Lab 07/28/20  0509   *   *   K 3.2*   CL 98   CO2 15*   BUN 67*   CREATININE 7.9*   CALCIUM 7.9*     CBC:   Recent Labs   Lab 07/27/20  0448 07/28/20  0509   WBC 24.94* 18.54*   HGB 9.5* 9.6*   HCT 28.1* 27.8*    320       Significant Imaging: I have reviewed all pertinent imaging results/findings within the past 24 hours.

## 2020-07-28 NOTE — PROGRESS NOTES
"gen surg pod 3  Same pain at sx site, not improved post op  Blood pressure (!) 186/79, pulse 97, temperature 97.8 °F (36.6 °C), temperature source Oral, resp. rate 17, height 6' 2" (1.88 m), weight 102.1 kg (225 lb), SpO2 99 %.  wbc down to 18  abcess cx mssa-on ancef  L prox posterior thigh wound packed open, some serosang dc, no purulent dc, no necrotic tissue, no crepitance or fluctaunce, mild surrounding induration  A/p s/p I&D L thigh abcess- cont abx and dressing changes, will get ct pelvis to eval for undrained fluid collection with persistent pain  "

## 2020-07-28 NOTE — SUBJECTIVE & OBJECTIVE
Interval History: About the same.    Review of Systems   HENT: Negative for ear discharge and ear pain.    Eyes: Negative for discharge and itching.   Endocrine: Negative for cold intolerance and heat intolerance.   Neurological: Negative for seizures and syncope.     Objective:     Vital Signs (Most Recent):  Temp: 97.6 °F (36.4 °C) (07/28/20 0734)  Pulse: 93 (07/28/20 0734)  Resp: 19 (07/28/20 0826)  BP: (!) 178/93 (07/28/20 0820)  SpO2: 98 % (07/28/20 0734) Vital Signs (24h Range):  Temp:  [97.6 °F (36.4 °C)-98.1 °F (36.7 °C)] 97.6 °F (36.4 °C)  Pulse:  [] 93  Resp:  [17-19] 19  SpO2:  [98 %-100 %] 98 %  BP: (133-193)/(62-93) 178/93     Weight: 102.1 kg (225 lb)  Body mass index is 28.89 kg/m².    Intake/Output Summary (Last 24 hours) at 7/28/2020 1050  Last data filed at 7/28/2020 0900  Gross per 24 hour   Intake 3415 ml   Output --   Net 3415 ml      Physical Exam  Vitals signs and nursing note reviewed.   Constitutional:       General: He is not in acute distress.     Appearance: He is well-developed.   HENT:      Head: Normocephalic and atraumatic.      Right Ear: External ear normal.      Left Ear: External ear normal.   Eyes:      General:         Right eye: No discharge.         Left eye: No discharge.      Conjunctiva/sclera: Conjunctivae normal.   Neck:      Musculoskeletal: Normal range of motion.      Thyroid: No thyromegaly.   Cardiovascular:      Rate and Rhythm: Normal rate and regular rhythm.      Heart sounds: No murmur.   Pulmonary:      Effort: Pulmonary effort is normal. No respiratory distress.      Breath sounds: Normal breath sounds.   Abdominal:      General: Bowel sounds are normal. There is no distension.      Palpations: Abdomen is soft. There is no mass.      Tenderness: There is no abdominal tenderness.   Musculoskeletal:         General: No deformity.   Skin:     General: Skin is warm and dry.      Comments: Wound packing noted with serosang material on guaze.  Tender to touch  and mild surrounding induration   Neurological:      Mental Status: He is alert and oriented to person, place, and time.   Psychiatric:         Behavior: Behavior normal.         Significant Labs:   BMP:   Recent Labs   Lab 07/28/20  0509   *   *   K 3.2*   CL 98   CO2 15*   BUN 67*   CREATININE 7.9*   CALCIUM 7.9*     CBC:   Recent Labs   Lab 07/27/20  0448 07/28/20  0509   WBC 24.94* 18.54*   HGB 9.5* 9.6*   HCT 28.1* 27.8*    320       Significant Imaging: I have reviewed all pertinent imaging results/findings within the past 24 hours.

## 2020-07-28 NOTE — ASSESSMENT & PLAN NOTE
35M with T2DM, HTN, HLD admitted 7/24 with leg pain, swelling. Found to have thigh abscess. S/p I&D, no evidence of muscle necrosis. Cultures growing staph and group B strep. Fevers resolved. Leukocytosis downtrending. No abscess on f/u CT scan. ID c/f abx recs     Recommendations:   - continue IV cefazolin while inpatient  - plan to de-escalate to renally dosed po cefadroxil x 14 days from date of I&D (est end date 8/7/20). If crcl 10-25ml/min, this would be 500mg cefadroxil daily  - please notify ID with any growth in new culture

## 2020-07-28 NOTE — PROGRESS NOTES
[facility] Hospital Medicine  Progress Note    Patient Name: Chas Henao  MRN: [id number]  Patient Class: IP- Inpatient   Admission Date: 7/24/2020  Length of Stay: 3 days  Attending Physician: Obed Carroll MD  Primary Care Provider: Primary Doctor No        Subjective:     Principal Problem:Necrotizing fasciitis        HPI:  Chas Henao 35 y.o. male with type 1 diabetes, HTN, and HLD presents to the hospital with a chief complaint of leg pain. He reports 1 week ago he saw what he thought was a pimple to his left medial thigh that he attempted to express without success and the abscess expanded. Over the last week he has experienced an increasing constant throbbing pain without radiation improved with I&D of abscess in the ED and worsened with ambulation. He denies any trauma or injury to the leg. He has not lost control of his bowel/bladder. He denies fever, chest pain, SOB, N/V, abdominal pain, dysuria, dizziness, syncope.     In the ED, tachycardic to 105, WBC of 22, abscess seen on CT, lactic acid normal, Cr of 2.1.     Overview/Hospital Course:  Chas Henao 35 y.o. male placed in observation for left upper thigh abscess, NORAH and sepsis.  I&D in ED. Rocephin/Flagyl/doxy started on admission. Patient received CT with contrast on admission. General surgery and Nephrology consulted. US renal showed normal side kidneys, bilateral kidneys and bilateral elevated renal indices. 7/25 Underwent I & D of left upper thigh area that showed some necrotizing fasciitis.  Antibiotics switched to vancomycin and meropenem IV.  Renal function continued to worsen.  On 07/26 urine output approximately 500 mL over 24 hr. Fever subsided, but persistent leukocytosis. Nephrology felt ATN and continue IVF.  Vanc stopped and patient started on Ancef.    Interval History: About the same.    Review of Systems   HENT: Negative for ear discharge and ear pain.    Eyes: Negative for discharge and itching.    Endocrine: Negative for cold intolerance and heat intolerance.   Neurological: Negative for seizures and syncope.     Objective:     Vital Signs (Most Recent):  Temp: 97.6 °F (36.4 °C) (07/28/20 0734)  Pulse: 93 (07/28/20 0734)  Resp: 19 (07/28/20 0826)  BP: (!) 178/93 (07/28/20 0820)  SpO2: 98 % (07/28/20 0734) Vital Signs (24h Range):  Temp:  [97.6 °F (36.4 °C)-98.1 °F (36.7 °C)] 97.6 °F (36.4 °C)  Pulse:  [] 93  Resp:  [17-19] 19  SpO2:  [98 %-100 %] 98 %  BP: (133-193)/(62-93) 178/93     Weight: 102.1 kg (225 lb)  Body mass index is 28.89 kg/m².    Intake/Output Summary (Last 24 hours) at 7/28/2020 1050  Last data filed at 7/28/2020 0900  Gross per 24 hour   Intake 3415 ml   Output --   Net 3415 ml      Physical Exam  Vitals signs and nursing note reviewed.   Constitutional:       General: He is not in acute distress.     Appearance: He is well-developed.   HENT:      Head: Normocephalic and atraumatic.      Right Ear: External ear normal.      Left Ear: External ear normal.   Eyes:      General:         Right eye: No discharge.         Left eye: No discharge.      Conjunctiva/sclera: Conjunctivae normal.   Neck:      Musculoskeletal: Normal range of motion.      Thyroid: No thyromegaly.   Cardiovascular:      Rate and Rhythm: Normal rate and regular rhythm.      Heart sounds: No murmur.   Pulmonary:      Effort: Pulmonary effort is normal. No respiratory distress.      Breath sounds: Normal breath sounds.   Abdominal:      General: Bowel sounds are normal. There is no distension.      Palpations: Abdomen is soft. There is no mass.      Tenderness: There is no abdominal tenderness.   Musculoskeletal:         General: No deformity.   Skin:     General: Skin is warm and dry.      Comments: Wound packing noted with serosang material on guaze.  Tender to touch and mild surrounding induration   Neurological:      Mental Status: He is alert and oriented to person, place, and time.   Psychiatric:          "Behavior: Behavior normal.         Significant Labs:   BMP:   Recent Labs   Lab 07/28/20  0509   *   *   K 3.2*   CL 98   CO2 15*   BUN 67*   CREATININE 7.9*   CALCIUM 7.9*     CBC:   Recent Labs   Lab 07/27/20  0448 07/28/20  0509   WBC 24.94* 18.54*   HGB 9.5* 9.6*   HCT 28.1* 27.8*    320       Significant Imaging: I have reviewed all pertinent imaging results/findings within the past 24 hours.      Assessment/Plan:      * Necrotizing fasciitis  -Patient admitted to hospital for left upper thigh abscess  -CT with "Findings suggesting cellulitis involving the posteromedial proximal thigh soft tissues, with subcutaneous strand-like edema, extending to the fascial surface.  There is punctate subcutaneous emphysema within the superficial subcutaneous fat, adjacent to a small amount of disorganized fluid.  No discrete formed abscess at this time, however developing abscess is a consideration versus previously drained abscess."  - on admit Started on Rocephin/Doxy/Flagyl as CKD and received IV contrast  - 7/25 switched to Vanc and meropenem-Pharmacy following ; ID consulted  - 7/25 I&D of left upper thigh showed some necrotizing fascitis that was minimal with viable muscle  Afebrile, but persistent leukocytosis.  Wound Cx growing Staph.  Worsening renal failure.  Vanc stopped and ABx's switched to Ancef.  Surgery continuing to monitor.      NORAH (acute kidney injury)  Cr of 2.1 on arrival.  Reports chronic use of NSAID "all kinds" for pain   Appreciate Nephrology consult.  Probable ATN from infection and HERSON  Vanc stopped.  Avoid nephrotoxic medication.  Not much increase in Creat today.  Hopefully it has peaked and will start improving.              Tobacco user  Smokes 1/5 pack a week. Encourage smoking cessation. Declines nicotine patch. Not craving.      Anemia due to chronic kidney disease  H/H stable over past few days.  No evidence of bleeding.      Sepsis  Secondary to above.  Sepsis " secondary to Staph    Diabetes mellitus type 1  Uncontrolled with hyperglycemia.  hgbA1c 12.4  Diabetic diet and insulin sliding scale.  Continue current management and adjust scheduled insulin as needed.      HLD (hyperlipidemia)  Continue home statin    Essential hypertension  Holding home losartan with ARF.  Continue hydralazine prn  Increasing BP.  Will start Norvasc.        VTE Risk Mitigation (From admission, onward)         Ordered     heparin (porcine) injection 5,000 Units  Every 8 hours      07/26/20 1313     Place CHRIS hose  Until discontinued      07/24/20 1843     IP VTE HIGH RISK PATIENT  Once      07/24/20 1801     Place sequential compression device  Until discontinued      07/24/20 1801                      Obed Carroll MD  Department of Hospital Medicine   [facility]

## 2020-07-28 NOTE — PROGRESS NOTES
Awake alert oriented NAD    Still with groin pain for Ct today    Denies CNS ENT CP GI  SX  Past Medical History:   Diagnosis Date    Diabetes mellitus     Hypertension      Past Surgical History:   Procedure Laterality Date    INCISION AND DRAINAGE Left 7/25/2020    Procedure: INCISION AND DRAINAGE;  Surgeon: Lion Durham MD;  Location: [facility];  Service: General;  Laterality: Left;  perineal and upper thigh     Review of patient's allergies indicates:  No Known Allergies    Current Facility-Administered Medications   Medication    0.45% NaCl infusion    acetaminophen tablet 650 mg    amLODIPine tablet 5 mg    atorvastatin tablet 20 mg    ceFAZolin (ANCEF) 1 gram in dextrose 5 % 50 mL IVPB (premix)    dextrose 50% injection 12.5 g    dextrose 50% injection 25 g    diphenhydrAMINE capsule 25 mg    famotidine tablet 20 mg    glucagon (human recombinant) injection 1 mg    glucose chewable tablet 16 g    glucose chewable tablet 24 g    heparin (porcine) injection 5,000 Units    hydrALAZINE injection 10 mg    insulin aspart U-100 pen 0-5 Units    insulin aspart U-100 pen 10 Units    insulin detemir U-100 pen 25 Units    ondansetron injection 4 mg    oxyCODONE-acetaminophen 5-325 mg per tablet 1 tablet    pantoprazole EC tablet 40 mg    promethazine (PHENERGAN) 6.25 mg in dextrose 5 % 50 mL IVPB    senna-docusate 8.6-50 mg per tablet 1 tablet    sodium chloride 0.9% flush 10 mL    sodium chloride 0.9% flush 10 mL       LABS    Recent Results (from the past 24 hour(s))   Echo Color Flow Doppler? Yes    Collection Time: 07/27/20 12:54 PM   Result Value Ref Range    BSA 2.31 m2    LA WIDTH 2.81 cm    AORTIC VALVE CUSP SEPERATION 2.31 cm    PV PEAK VELOCITY 1.64 cm/s    LVIDD 4.12 3.5 - 6.0 cm    IVS 1.75 (A) 0.6 - 1.1 cm    PW 1.68 (A) 0.6 - 1.1 cm    Ao root annulus 3.06 cm    LVIDS 2.22 2.1 - 4.0 cm    FS 46 28 - 44 %    LA volume 48.18 cm3    Sinus 3.10 cm    STJ 2.40 cm    Ascending  aorta 2.77 cm    LV mass 300.48 g    LA size 3.94 cm    RVDD 3.66 cm    TAPSE 2.11 cm    RV S' 23.62 cm/s    Left Ventricle Relative Wall Thickness 0.82 cm    AV mean gradient 16 mmHg    AV valve area 3.94 cm2    AV Velocity Ratio 0.94     AV index (prosthetic) 0.96     E/A ratio 0.89     E wave decelartion time 191.62 msec    IVRT 55.36 msec    Pulm vein S/D ratio 1.09     LVOT diameter 2.29 cm    LVOT area 4.1 cm2    LVOT peak peter 2.36 m/s    LVOT peak VTI 31.87 cm    Ao peak peter 2.50 m/s    Ao VTI 33.28 cm    LVOT stroke volume 131.20 cm3    AV peak gradient 25 mmHg    MV Peak E Peter 1.13 m/s    TR Max Peter 3.88 m/s    MV Peak A Peter 1.27 m/s    PV Peak S Peter 0.61 m/s    PV Peak D Peter 0.56 m/s    LV Systolic Volume 16.58 mL    LV Systolic Volume Index 7.3 mL/m2    LV Diastolic Volume 75.04 mL    LV Diastolic Volume Index 32.84 mL/m2    LA Volume Index 21.1 mL/m2    LV Mass Index 132 g/m2    RA Major Axis 3.93 cm    Left Atrium Minor Axis 5.07 cm    Left Atrium Major Axis 5.17 cm    Triscuspid Valve Regurgitation Peak Gradient 60 mmHg    RA Width 3.00 cm    Right Atrial Pressure (from IVC) 3 mmHg    TV rest pulmonary artery pressure 63 mmHg   POCT glucose    Collection Time: 07/27/20  4:25 PM   Result Value Ref Range    POCT Glucose 251 (H) 70 - 110 mg/dL   POCT glucose    Collection Time: 07/27/20  7:49 PM   Result Value Ref Range    POCT Glucose 253 (H) 70 - 110 mg/dL   CBC auto differential    Collection Time: 07/28/20  5:09 AM   Result Value Ref Range    WBC 18.54 (H) 3.90 - 12.70 K/uL    RBC 3.40 (L) 4.60 - 6.20 M/uL    Hemoglobin 9.6 (L) 14.0 - 18.0 g/dL    Hematocrit 27.8 (L) 40.0 - 54.0 %    Mean Corpuscular Volume 82 82 - 98 fL    Mean Corpuscular Hemoglobin 28.2 27.0 - 31.0 pg    Mean Corpuscular Hemoglobin Conc 34.5 32.0 - 36.0 g/dL    RDW 12.7 11.5 - 14.5 %    Platelets 320 150 - 350 K/uL    MPV 10.5 9.2 - 12.9 fL    Immature Granulocytes 2.1 (H) 0.0 - 0.5 %    Gran # (ANC) 13.6 (H) 1.8 - 7.7 K/uL     Immature Grans (Abs) 0.39 (H) 0.00 - 0.04 K/uL    Lymph # 3.0 1.0 - 4.8 K/uL    Mono # 1.3 (H) 0.3 - 1.0 K/uL    Eos # 0.2 0.0 - 0.5 K/uL    Baso # 0.06 0.00 - 0.20 K/uL    nRBC 0 0 /100 WBC    Gran% 73.3 (H) 38.0 - 73.0 %    Lymph% 16.0 (L) 18.0 - 48.0 %    Mono% 7.2 4.0 - 15.0 %    Eosinophil% 1.1 0.0 - 8.0 %    Basophil% 0.3 0.0 - 1.9 %    Differential Method Automated    Basic metabolic panel    Collection Time: 07/28/20  5:09 AM   Result Value Ref Range    Sodium 128 (L) 136 - 145 mmol/L    Potassium 3.2 (L) 3.5 - 5.1 mmol/L    Chloride 98 95 - 110 mmol/L    CO2 15 (L) 23 - 29 mmol/L    Glucose 174 (H) 70 - 110 mg/dL    BUN, Bld 67 (H) 6 - 20 mg/dL    Creatinine 7.9 (H) 0.5 - 1.4 mg/dL    Calcium 7.9 (L) 8.7 - 10.5 mg/dL    Anion Gap 15 8 - 16 mmol/L    eGFR if African American 9 (A) >60 mL/min/1.73 m^2    eGFR if non African American 8 (A) >60 mL/min/1.73 m^2   POCT glucose    Collection Time: 07/28/20  7:32 AM   Result Value Ref Range    POCT Glucose 214 (H) 70 - 110 mg/dL   POCT glucose    Collection Time: 07/28/20 11:22 AM   Result Value Ref Range    POCT Glucose 168 (H) 70 - 110 mg/dL   ]    I/O last 3 completed shifts:  In: 3295 [P.O.:1440; I.V.:1355; IV Piggyback:500]  Out: -     Vitals:    07/28/20 0734 07/28/20 0820 07/28/20 0826 07/28/20 1126   BP: (!) 193/84 (!) 178/93  (!) 180/74   Pulse: 93   94   Resp: 19  19 19   Temp: 97.6 °F (36.4 °C)   97.6 °F (36.4 °C)   TempSrc: Oral   Oral   SpO2: 98%   95%   Weight:       Height:           No Jvd, Thyromegaly or Lymphadenopathy  Lungs: Fairly clear anteriorly and laterally  Cor: RRR no G or rubs  Abd: Soft benign good bowel sounds non tender  Ext: No E C C    A)    NORAH secondary to multiple causes: Out pt aggressive use of NSAIDs, infection, CT abd/pelvis with IV contrast, dm, htn  Creat continues rising poor uo ?  DM  HTN  L thigh infection     P)     Renal Diet  Home meds  HD might be needed  EPO might be needed  Binders might be needed  Adjust all meds  to the degree of renal fx  Close follow up I/O and weights  Maintain Hydration

## 2020-07-28 NOTE — ASSESSMENT & PLAN NOTE
"Cr of 2.1 on arrival.  Reports chronic use of NSAID "all kinds" for pain   Appreciate Nephrology consult.  Probable ATN from infection and HERSON  Vanc stopped.  Avoid nephrotoxic medication.  Not much increase in Creat today.  Hopefully it has peaked and will start improving.            "

## 2020-07-29 LAB
ANCA AB TITR SER IF: NORMAL TITER
ANION GAP SERPL CALC-SCNC: 10 MMOL/L (ref 8–16)
BACTERIA BLD CULT: NORMAL
BACTERIA BLD CULT: NORMAL
BASOPHILS # BLD AUTO: 0.04 K/UL (ref 0–0.2)
BASOPHILS NFR BLD: 0.3 % (ref 0–1.9)
BUN SERPL-MCNC: 60 MG/DL (ref 6–20)
CALCIUM SERPL-MCNC: 8.4 MG/DL (ref 8.7–10.5)
CHLORIDE SERPL-SCNC: 106 MMOL/L (ref 95–110)
CO2 SERPL-SCNC: 17 MMOL/L (ref 23–29)
CREAT SERPL-MCNC: 5.5 MG/DL (ref 0.5–1.4)
DIFFERENTIAL METHOD: ABNORMAL
EOSINOPHIL # BLD AUTO: 0.2 K/UL (ref 0–0.5)
EOSINOPHIL NFR BLD: 1.7 % (ref 0–8)
EOSINOPHIL URNS QL WRIGHT STN: NORMAL
ERYTHROCYTE [DISTWIDTH] IN BLOOD BY AUTOMATED COUNT: 12.3 % (ref 11.5–14.5)
EST. GFR  (AFRICAN AMERICAN): 14 ML/MIN/1.73 M^2
EST. GFR  (NON AFRICAN AMERICAN): 12 ML/MIN/1.73 M^2
GLUCOSE SERPL-MCNC: 223 MG/DL (ref 70–110)
HCT VFR BLD AUTO: 26.6 % (ref 40–54)
HGB BLD-MCNC: 9.4 G/DL (ref 14–18)
IMM GRANULOCYTES # BLD AUTO: 0.25 K/UL (ref 0–0.04)
IMM GRANULOCYTES NFR BLD AUTO: 1.8 % (ref 0–0.5)
INTERPRETATION SERPL IFE-IMP: NORMAL
LYMPHOCYTES # BLD AUTO: 2.6 K/UL (ref 1–4.8)
LYMPHOCYTES NFR BLD: 18.8 % (ref 18–48)
MCH RBC QN AUTO: 28.5 PG (ref 27–31)
MCHC RBC AUTO-ENTMCNC: 35.3 G/DL (ref 32–36)
MCV RBC AUTO: 81 FL (ref 82–98)
MONOCYTES # BLD AUTO: 1.1 K/UL (ref 0.3–1)
MONOCYTES NFR BLD: 8 % (ref 4–15)
NEUTROPHILS # BLD AUTO: 9.7 K/UL (ref 1.8–7.7)
NEUTROPHILS NFR BLD: 69.4 % (ref 38–73)
NRBC BLD-RTO: 0 /100 WBC
P-ANCA TITR SER IF: NORMAL TITER
PATHOLOGIST INTERPRETATION IFE: NORMAL
PLATELET # BLD AUTO: 460 K/UL (ref 150–350)
PMV BLD AUTO: 10.6 FL (ref 9.2–12.9)
POCT GLUCOSE: 120 MG/DL (ref 70–110)
POCT GLUCOSE: 175 MG/DL (ref 70–110)
POCT GLUCOSE: 215 MG/DL (ref 70–110)
POCT GLUCOSE: 229 MG/DL (ref 70–110)
POTASSIUM SERPL-SCNC: 3.5 MMOL/L (ref 3.5–5.1)
RBC # BLD AUTO: 3.3 M/UL (ref 4.6–6.2)
SODIUM SERPL-SCNC: 133 MMOL/L (ref 136–145)
WBC # BLD AUTO: 13.9 K/UL (ref 3.9–12.7)

## 2020-07-29 PROCEDURE — 25000003 PHARM REV CODE 250: Performed by: HOSPITALIST

## 2020-07-29 PROCEDURE — 25000003 PHARM REV CODE 250: Performed by: SURGERY

## 2020-07-29 PROCEDURE — 25000003 PHARM REV CODE 250: Performed by: NURSE PRACTITIONER

## 2020-07-29 PROCEDURE — 36415 COLL VENOUS BLD VENIPUNCTURE: CPT

## 2020-07-29 PROCEDURE — 80048 BASIC METABOLIC PNL TOTAL CA: CPT

## 2020-07-29 PROCEDURE — 63600175 PHARM REV CODE 636 W HCPCS: Performed by: INTERNAL MEDICINE

## 2020-07-29 PROCEDURE — 63600175 PHARM REV CODE 636 W HCPCS: Performed by: HOSPITALIST

## 2020-07-29 PROCEDURE — C9399 UNCLASSIFIED DRUGS OR BIOLOG: HCPCS | Performed by: NURSE PRACTITIONER

## 2020-07-29 PROCEDURE — 63600175 PHARM REV CODE 636 W HCPCS: Performed by: SURGERY

## 2020-07-29 PROCEDURE — 11000001 HC ACUTE MED/SURG PRIVATE ROOM

## 2020-07-29 PROCEDURE — 25000003 PHARM REV CODE 250: Performed by: INTERNAL MEDICINE

## 2020-07-29 PROCEDURE — 63600175 PHARM REV CODE 636 W HCPCS: Performed by: NURSE PRACTITIONER

## 2020-07-29 PROCEDURE — 85025 COMPLETE CBC W/AUTO DIFF WBC: CPT

## 2020-07-29 PROCEDURE — 94761 N-INVAS EAR/PLS OXIMETRY MLT: CPT

## 2020-07-29 RX ORDER — AMLODIPINE BESYLATE 5 MG/1
10 TABLET ORAL DAILY
Status: DISCONTINUED | OUTPATIENT
Start: 2020-07-30 | End: 2020-07-31 | Stop reason: HOSPADM

## 2020-07-29 RX ORDER — MORPHINE SULFATE 10 MG/ML
2 INJECTION INTRAMUSCULAR; INTRAVENOUS; SUBCUTANEOUS EVERY 6 HOURS PRN
Status: DISCONTINUED | OUTPATIENT
Start: 2020-07-29 | End: 2020-07-31 | Stop reason: HOSPADM

## 2020-07-29 RX ORDER — AMLODIPINE BESYLATE 5 MG/1
5 TABLET ORAL ONCE
Status: COMPLETED | OUTPATIENT
Start: 2020-07-29 | End: 2020-07-29

## 2020-07-29 RX ORDER — CLONIDINE HYDROCHLORIDE 0.1 MG/1
0.2 TABLET ORAL EVERY 8 HOURS PRN
Status: DISCONTINUED | OUTPATIENT
Start: 2020-07-29 | End: 2020-07-31 | Stop reason: HOSPADM

## 2020-07-29 RX ORDER — HYDRALAZINE HYDROCHLORIDE 20 MG/ML
10 INJECTION INTRAMUSCULAR; INTRAVENOUS ONCE
Status: COMPLETED | OUTPATIENT
Start: 2020-07-29 | End: 2020-07-29

## 2020-07-29 RX ADMIN — ATORVASTATIN CALCIUM 20 MG: 10 TABLET, FILM COATED ORAL at 08:07

## 2020-07-29 RX ADMIN — PROMETHAZINE HYDROCHLORIDE: 25 INJECTION INTRAMUSCULAR; INTRAVENOUS at 08:07

## 2020-07-29 RX ADMIN — AMLODIPINE BESYLATE 5 MG: 5 TABLET ORAL at 08:07

## 2020-07-29 RX ADMIN — MORPHINE SULFATE 2 MG: 10 INJECTION INTRAVENOUS at 05:07

## 2020-07-29 RX ADMIN — CEFAZOLIN SODIUM 1 G: 1 SOLUTION INTRAVENOUS at 11:07

## 2020-07-29 RX ADMIN — HEPARIN SODIUM 5000 UNITS: 5000 INJECTION INTRAVENOUS; SUBCUTANEOUS at 05:07

## 2020-07-29 RX ADMIN — HYDRALAZINE HYDROCHLORIDE 10 MG: 20 INJECTION INTRAMUSCULAR; INTRAVENOUS at 12:07

## 2020-07-29 RX ADMIN — ONDANSETRON HYDROCHLORIDE 4 MG: 2 SOLUTION INTRAMUSCULAR; INTRAVENOUS at 05:07

## 2020-07-29 RX ADMIN — HYDRALAZINE HYDROCHLORIDE 10 MG: 20 INJECTION INTRAMUSCULAR; INTRAVENOUS at 07:07

## 2020-07-29 RX ADMIN — OXYCODONE HYDROCHLORIDE AND ACETAMINOPHEN 1 TABLET: 5; 325 TABLET ORAL at 07:07

## 2020-07-29 RX ADMIN — INSULIN ASPART 10 UNITS: 100 INJECTION, SOLUTION INTRAVENOUS; SUBCUTANEOUS at 08:07

## 2020-07-29 RX ADMIN — HEPARIN SODIUM 5000 UNITS: 5000 INJECTION INTRAVENOUS; SUBCUTANEOUS at 01:07

## 2020-07-29 RX ADMIN — SODIUM CHLORIDE: 0.45 INJECTION, SOLUTION INTRAVENOUS at 12:07

## 2020-07-29 RX ADMIN — INSULIN ASPART 1 UNITS: 100 INJECTION, SOLUTION INTRAVENOUS; SUBCUTANEOUS at 01:07

## 2020-07-29 RX ADMIN — INSULIN DETEMIR 25 UNITS: 100 INJECTION, SOLUTION SUBCUTANEOUS at 08:07

## 2020-07-29 RX ADMIN — PANTOPRAZOLE SODIUM 40 MG: 40 TABLET, DELAYED RELEASE ORAL at 08:07

## 2020-07-29 RX ADMIN — CLONIDINE HYDROCHLORIDE 0.2 MG: 0.1 TABLET ORAL at 01:07

## 2020-07-29 RX ADMIN — INSULIN ASPART 2 UNITS: 100 INJECTION, SOLUTION INTRAVENOUS; SUBCUTANEOUS at 08:07

## 2020-07-29 RX ADMIN — SODIUM CHLORIDE: 0.45 INJECTION, SOLUTION INTRAVENOUS at 08:07

## 2020-07-29 RX ADMIN — AMLODIPINE BESYLATE 5 MG: 5 TABLET ORAL at 01:07

## 2020-07-29 RX ADMIN — INSULIN ASPART 10 UNITS: 100 INJECTION, SOLUTION INTRAVENOUS; SUBCUTANEOUS at 11:07

## 2020-07-29 RX ADMIN — OXYCODONE HYDROCHLORIDE AND ACETAMINOPHEN 1 TABLET: 5; 325 TABLET ORAL at 02:07

## 2020-07-29 RX ADMIN — OXYCODONE HYDROCHLORIDE AND ACETAMINOPHEN 1 TABLET: 5; 325 TABLET ORAL at 01:07

## 2020-07-29 NOTE — PLAN OF CARE
07/29/20 1538   Discharge Reassessment   Assessment Type Discharge Planning Reassessment   Provided patient/caregiver education on the expected discharge date and the discharge plan Yes   Do you have any problems affording any of your prescribed medications? No   Discharge Plan A Home with family   DME Needed Upon Discharge  none   Patient choice form signed by patient/caregiver N/A   Anticipated Discharge Disposition Home   Can the patient/caregiver answer the patient profile reliably? Yes, cognitively intact   Post-Acute Status   Discharge Delays None known at this time     SW reviewed patients chart to address discharge needs. Pt's creatine levels have improved. Per nephrology, plan is to maintain hydration until medically stable to discharge. Patient will discharge home with family.

## 2020-07-29 NOTE — ASSESSMENT & PLAN NOTE
"Cr of 2.1 on arrival.  Reports chronic use of NSAID "all kinds" for pain   Appreciate Nephrology consult.  Probable ATN from infection and HERSON  Vanc stopped.  Avoid nephrotoxic medication.  Creat peaked and has finally started to improve.            "

## 2020-07-29 NOTE — PROGRESS NOTES
Surgery Progress Note    Primary Problem: Necrotizing fasciitis    Other problems:   Active Hospital Problems    Diagnosis  POA    *Necrotizing fasciitis [M72.6]  Yes    MSSA (methicillin susceptible Staphylococcus aureus) [A49.01]  Yes    Tobacco user [Z72.0]  Yes    Anemia due to chronic kidney disease [N18.9, D63.1]  Yes    Essential hypertension [I10]  Yes    HLD (hyperlipidemia) [E78.5]  Yes    Diabetes mellitus type 1 [E10.9]  Yes    NORAH (acute kidney injury) [N17.9]  Yes    Sepsis [A41.9]  Yes      Resolved Hospital Problems   No resolved problems to display.       HD #  LOS: 4 days   POD #4 Days Post-Op status post I and D posterior thigh wound    Overnight events:  No acute events overnight.  Pain improving.        Diet - Diet diabetic [facility]; 2000 Calorie; Renal     I/O last 3 completed shifts:  In: 3892 [P.O.:2037; I.V.:1355; IV Piggyback:500]  Out: 1600 [Urine:1600]    Intake/Output Summary (Last 24 hours) at 7/29/2020 0642  Last data filed at 7/29/2020 0600  Gross per 24 hour   Intake 2156 ml   Output 4120 ml   Net -1964 ml       Temp:  [97.6 °F (36.4 °C)-98.5 °F (36.9 °C)] 98.1 °F (36.7 °C)  Pulse:  [] 95  Resp:  [16-20] 20  SpO2:  [95 %-99 %] 98 %  BP: (126-193)/(64-93) 161/78    Gen: alert, well appearing, and in no distress,    Left thigh base is clean.  Serosanguineous discharge.  No erythema.  No purulence expressed.  No crepitus or fluctuance    Recent Labs   Lab 07/24/20  1302  07/24/20  1536 07/25/20  0528 07/25/20  1807 07/26/20  0520 07/27/20  0448 07/27/20  0449 07/28/20  0509   WBC 22.45*  --   --  22.00*  --  26.45* 24.94*  --  18.54*   HGB 11.6*  --   --  9.8*  --  9.5* 9.5*  --  9.6*   HCT 34.0*  --   --  28.7*  --  28.2* 28.1*  --  27.8*     --   --  194  --  212 263  --  320   NA  --    < > 133* 128* 127* 130*  --  127* 128*   K  --    < > 3.4* 3.4* 4.2 3.8  --  3.5 3.2*   CL  --    < > 103 99 99 98  --  95 98   BUN  --    < > 33* 37* 44* 55*  --  68*  67*   GLU  --    < > 202* 249* 198* 272*  --  227* 174*   PROT  --   --  6.4 5.8*  --   --   --  5.8*  --    ALBUMIN  --   --  1.6* 1.5* 1.4* 1.3*  --  1.4*  --    BILITOT  --   --  0.3 0.2  --   --   --  0.1  --    AST  --   --  12 10  --   --   --  14  --    ALKPHOS  --   --  106 156*  --   --   --  122  --    ALT  --   --  6* 9*  --   --   --  11  --     < > = values in this interval not displayed.      CT pelvis without any undrained fluid collections    Assessment:  Status post I and D thigh for necrotizing soft tissue infection    Plan:  Continue local wound care  Appreciate ID recommendations  Follow-up a.mTabatha Wright MD     Addendum:  WBC improving, Cre improving

## 2020-07-29 NOTE — ASSESSMENT & PLAN NOTE
"-Patient admitted to hospital for left upper thigh abscess  -CT with "Findings suggesting cellulitis involving the posteromedial proximal thigh soft tissues, with subcutaneous strand-like edema, extending to the fascial surface.  There is punctate subcutaneous emphysema within the superficial subcutaneous fat, adjacent to a small amount of disorganized fluid.  No discrete formed abscess at this time, however developing abscess is a consideration versus previously drained abscess."  - on admit Started on Rocephin/Doxy/Flagyl as CKD and received IV contrast  - 7/25 switched to Vanc and meropenem-Pharmacy following ; ID consulted  - 7/25 I&D of left upper thigh showed some necrotizing fascitis that was minimal with viable muscle  Vanc stopped and ABx's switched to Ancef.  Surgery continuing to monitor.  Repeat CT with no fluid collections.  Appreciate ID input.  Ancef while inpatient and can change to oral Cefadroxil (until 8/7) upon discharge.  Improving leukocytosis.    "

## 2020-07-29 NOTE — PLAN OF CARE
Pt alert and oriented x 4. Respirations are even and unlabored. Reports pain to L buttock 7/10. Medications administered as ordered. Pt tolerated oral meds well. IV fluids continued @ 75 ml/hr. Pt had an episode of vomiting and refused dinner insulin, d/t CBG of 120. Zofran administered for relief. Pt also refused day shift wound care. Pt remains free from falls or injury throughout shift. Will continue to monitor.        Problem: Adult Inpatient Plan of Care  Goal: Plan of Care Review  Outcome: Ongoing, Progressing     Problem: Fall Injury Risk  Goal: Absence of Fall and Fall-Related Injury  Outcome: Ongoing, Progressing     Problem: Diabetes Comorbidity  Goal: Blood Glucose Level Within Desired Range  Outcome: Ongoing, Progressing     Problem: Adjustment to Illness (Sepsis/Septic Shock)  Goal: Optimal Coping  Outcome: Ongoing, Progressing     Problem: Hemodynamic Instability (Sepsis/Septic Shock)  Goal: Effective Tissue Perfusion  Outcome: Ongoing, Progressing     Problem: Infection (Sepsis/Septic Shock)  Goal: Absence of Infection Signs/Symptoms  Outcome: Ongoing, Progressing

## 2020-07-29 NOTE — PLAN OF CARE
Problem: Fall Injury Risk  Goal: Absence of Fall and Fall-Related Injury  Outcome: Ongoing, Progressing     Problem: Adult Inpatient Plan of Care  Goal: Plan of Care Review  Outcome: Ongoing, Progressing     Problem: Adult Inpatient Plan of Care  Goal: Absence of Hospital-Acquired Illness or Injury  Outcome: Ongoing, Progressing     Problem: Diabetes Comorbidity  Goal: Blood Glucose Level Within Desired Range  Outcome: Ongoing, Progressing     Problem: Adjustment to Illness (Sepsis/Septic Shock)  Goal: Optimal Coping  Outcome: Ongoing, Progressing     Problem: Infection (Sepsis/Septic Shock)  Goal: Absence of Infection Signs/Symptoms  Outcome: Ongoing, Progressing

## 2020-07-29 NOTE — PHYSICIAN QUERY
"PT Name: Chas Henao  MR #: [id number]    Procedure Clarification     CDS: Malinda GREGORY RN  Contact information: [email or web address]    This form is a permanent document in the medical record.    Query Date: July 29, 2020  By submitting this query, we are merely seeking further clarification of documentation. Please utilize your independent clinical judgment when addressing the question(s) below.    The Medical Record contains the following:   Indicator Supporting Clinical Findings Location in Medical Record   X Documentation of "Debridement" Procedure is I and D of skin and soft tissue with debridement of fascia. No evidence of muscle necrosis; culture of area    Organization time-out been performed the area and the left medial upper thigh in the posterior was incised for about 10-12 cm the 2nd skin subcutaneous tissue the surrounding skin subcutaneous tissue necrotic areas were all removed with Bovie cauterization the file this down toward the muscle. There was some fibrous exudate over the muscle with some slight purulence this was debrided. At and opened up all this area was around 3-4 cm of the muscle was edematous but there was no evidence of the muscle necrosis. The entire area was debrided it was around 15 x 8 cm. The skin and subcutaneous tissue and underlying inflammatory areas had been debrided.     Op Note 7/25/2020      Op Note 7/25/2020   X Documentation of "I&D" Status post I and D of left upper thigh area with some necrotizing fasciitis that was minimal with viable muscle   Progress Note Gen Surg 7/26/2020    Other       Excisional debridement is a surgical removal of nonvitalized tissue, necrosis or slough. The use of a sharp instrument does not always indicate that an excisional debridement was performed.    Nonexcisional debridement is the scraping, washing, irrigating, brushing away or removal of loose tissue fragments.    Provider, please provide further clarification on the procedure " performed on ______7/25/2020            :    [ x  ] Excisional Debridement to skin   [x   ] Excisional Debridement to subcutaneous tissue/fascia   [   ] Excisional Debridement to muscle   [   ] Excisional Debridement to tendon   [   ] Excisional Debridement to bone        [   ] Nonexcisional Debridement to skin   [   ] Nonexcisional Debridement to subcutaneous tissue/fascia   [   ] Nonexcisional Debridement to muscle   [   ] Nonexcisional Debridement to tendon   [   ] Nonexcisional Debridement to bone     [   ] Incision and Drainage only (please specify site of drainage): ___________   [   ] Other Procedure (please specify): _____________   [  ] Clinically Undetermined

## 2020-07-29 NOTE — SUBJECTIVE & OBJECTIVE
Interval History: Feeling better.    Review of Systems   HENT: Negative for ear discharge and ear pain.    Eyes: Negative for discharge and itching.   Endocrine: Negative for cold intolerance and heat intolerance.   Neurological: Negative for seizures and syncope.     Objective:     Vital Signs (Most Recent):  Temp: 97.7 °F (36.5 °C) (07/29/20 0745)  Pulse: 87 (07/29/20 0745)  Resp: 19 (07/29/20 0745)  BP: (!) 158/79 (07/29/20 0745)  SpO2: 98 % (07/29/20 0904) Vital Signs (24h Range):  Temp:  [97.6 °F (36.4 °C)-98.5 °F (36.9 °C)] 97.7 °F (36.5 °C)  Pulse:  [] 87  Resp:  [16-20] 19  SpO2:  [95 %-99 %] 98 %  BP: (126-191)/(64-84) 158/79     Weight: 102.1 kg (225 lb)  Body mass index is 28.89 kg/m².    Intake/Output Summary (Last 24 hours) at 7/29/2020 0909  Last data filed at 7/29/2020 0600  Gross per 24 hour   Intake 2036 ml   Output 4120 ml   Net -2084 ml      Physical Exam  Vitals signs and nursing note reviewed.   Constitutional:       General: He is not in acute distress.     Appearance: He is well-developed.   HENT:      Head: Normocephalic and atraumatic.      Right Ear: External ear normal.      Left Ear: External ear normal.   Eyes:      General:         Right eye: No discharge.         Left eye: No discharge.      Conjunctiva/sclera: Conjunctivae normal.   Neck:      Musculoskeletal: Normal range of motion.      Thyroid: No thyromegaly.   Cardiovascular:      Rate and Rhythm: Normal rate and regular rhythm.      Heart sounds: No murmur.   Pulmonary:      Effort: Pulmonary effort is normal. No respiratory distress.      Breath sounds: Normal breath sounds.   Abdominal:      General: Bowel sounds are normal. There is no distension.      Palpations: Abdomen is soft. There is no mass.      Tenderness: There is no abdominal tenderness.   Musculoskeletal:         General: No deformity.   Skin:     General: Skin is warm and dry.      Comments: Wound packing noted with serosang material on guaze.  Tender to  touch and mild surrounding induration   Neurological:      Mental Status: He is alert and oriented to person, place, and time.   Psychiatric:         Behavior: Behavior normal.         Significant Labs:   BMP:   Recent Labs   Lab 07/29/20  0728   *   *   K 3.5      CO2 17*   BUN 60*   CREATININE 5.5*   CALCIUM 8.4*     CBC:   Recent Labs   Lab 07/28/20  0509 07/29/20  0728   WBC 18.54* 13.90*   HGB 9.6* 9.4*   HCT 27.8* 26.6*    460*       Significant Imaging: I have reviewed all pertinent imaging results/findings within the past 24 hours.

## 2020-07-29 NOTE — PROGRESS NOTES
[facility] Hospital Medicine  Progress Note    Patient Name: Chas Henao  MRN: [id number]  Patient Class: IP- Inpatient   Admission Date: 7/24/2020  Length of Stay: 4 days  Attending Physician: Obed Carroll MD  Primary Care Provider: Primary Doctor No        Subjective:     Principal Problem:Necrotizing fasciitis        HPI:  Chas Henao 35 y.o. male with type 1 diabetes, HTN, and HLD presents to the hospital with a chief complaint of leg pain. He reports 1 week ago he saw what he thought was a pimple to his left medial thigh that he attempted to express without success and the abscess expanded. Over the last week he has experienced an increasing constant throbbing pain without radiation improved with I&D of abscess in the ED and worsened with ambulation. He denies any trauma or injury to the leg. He has not lost control of his bowel/bladder. He denies fever, chest pain, SOB, N/V, abdominal pain, dysuria, dizziness, syncope.     In the ED, tachycardic to 105, WBC of 22, abscess seen on CT, lactic acid normal, Cr of 2.1.     Overview/Hospital Course:  Chas Henao 35 y.o. male placed in observation for left upper thigh abscess, NORAH and sepsis.  I&D in ED. Rocephin/Flagyl/doxy started on admission. Patient received CT with contrast on admission. General surgery and Nephrology consulted. US renal showed normal side kidneys, bilateral kidneys and bilateral elevated renal indices. 7/25 Underwent I & D of left upper thigh area that showed some necrotizing fasciitis.  Antibiotics switched to vancomycin and meropenem IV.  Renal function continued to worsen.  On 07/26 urine output approximately 500 mL over 24 hr. Fever subsided, but persistent leukocytosis. Nephrology felt ATN and continue IVF.  Vanc stopped and patient started on Ancef.  ID recommending Ancef while inpatient and Cefadroxil upon discharge.  CT with no fluid collections.  Improving leukocytosis and Creat finally trending  downwards.    Interval History: Feeling better.    Review of Systems   HENT: Negative for ear discharge and ear pain.    Eyes: Negative for discharge and itching.   Endocrine: Negative for cold intolerance and heat intolerance.   Neurological: Negative for seizures and syncope.     Objective:     Vital Signs (Most Recent):  Temp: 97.7 °F (36.5 °C) (07/29/20 0745)  Pulse: 87 (07/29/20 0745)  Resp: 19 (07/29/20 0745)  BP: (!) 158/79 (07/29/20 0745)  SpO2: 98 % (07/29/20 0904) Vital Signs (24h Range):  Temp:  [97.6 °F (36.4 °C)-98.5 °F (36.9 °C)] 97.7 °F (36.5 °C)  Pulse:  [] 87  Resp:  [16-20] 19  SpO2:  [95 %-99 %] 98 %  BP: (126-191)/(64-84) 158/79     Weight: 102.1 kg (225 lb)  Body mass index is 28.89 kg/m².    Intake/Output Summary (Last 24 hours) at 7/29/2020 0909  Last data filed at 7/29/2020 0600  Gross per 24 hour   Intake 2036 ml   Output 4120 ml   Net -2084 ml      Physical Exam  Vitals signs and nursing note reviewed.   Constitutional:       General: He is not in acute distress.     Appearance: He is well-developed.   HENT:      Head: Normocephalic and atraumatic.      Right Ear: External ear normal.      Left Ear: External ear normal.   Eyes:      General:         Right eye: No discharge.         Left eye: No discharge.      Conjunctiva/sclera: Conjunctivae normal.   Neck:      Musculoskeletal: Normal range of motion.      Thyroid: No thyromegaly.   Cardiovascular:      Rate and Rhythm: Normal rate and regular rhythm.      Heart sounds: No murmur.   Pulmonary:      Effort: Pulmonary effort is normal. No respiratory distress.      Breath sounds: Normal breath sounds.   Abdominal:      General: Bowel sounds are normal. There is no distension.      Palpations: Abdomen is soft. There is no mass.      Tenderness: There is no abdominal tenderness.   Musculoskeletal:         General: No deformity.   Skin:     General: Skin is warm and dry.      Comments: Wound packing noted with serosang material on  "guaze.  Tender to touch and mild surrounding induration   Neurological:      Mental Status: He is alert and oriented to person, place, and time.   Psychiatric:         Behavior: Behavior normal.         Significant Labs:   BMP:   Recent Labs   Lab 07/29/20  0728   *   *   K 3.5      CO2 17*   BUN 60*   CREATININE 5.5*   CALCIUM 8.4*     CBC:   Recent Labs   Lab 07/28/20  0509 07/29/20  0728   WBC 18.54* 13.90*   HGB 9.6* 9.4*   HCT 27.8* 26.6*    460*       Significant Imaging: I have reviewed all pertinent imaging results/findings within the past 24 hours.      Assessment/Plan:      * Necrotizing fasciitis  -Patient admitted to hospital for left upper thigh abscess  -CT with "Findings suggesting cellulitis involving the posteromedial proximal thigh soft tissues, with subcutaneous strand-like edema, extending to the fascial surface.  There is punctate subcutaneous emphysema within the superficial subcutaneous fat, adjacent to a small amount of disorganized fluid.  No discrete formed abscess at this time, however developing abscess is a consideration versus previously drained abscess."  - on admit Started on Rocephin/Doxy/Flagyl as CKD and received IV contrast  - 7/25 switched to Vanc and meropenem-Pharmacy following ; ID consulted  - 7/25 I&D of left upper thigh showed some necrotizing fascitis that was minimal with viable muscle  Vanc stopped and ABx's switched to Ancef.  Surgery continuing to monitor.  Repeat CT with no fluid collections.  Appreciate ID input.  Ancef while inpatient and can change to oral Cefadroxil (until 8/7) upon discharge.  Improving leukocytosis.      NORAH (acute kidney injury)  Cr of 2.1 on arrival.  Reports chronic use of NSAID "all kinds" for pain   Appreciate Nephrology consult.  Probable ATN from infection and HERSON  Vanc stopped.  Avoid nephrotoxic medication.  Creat peaked and has finally started to improve.              Tobacco user  Smokes 1/5 pack a week. " Encourage smoking cessation. Declines nicotine patch. Not craving.      Anemia due to chronic kidney disease  H/H stable over past few days.  No evidence of bleeding.      Sepsis  Secondary to above.  Sepsis secondary to Staph    Diabetes mellitus type 1  Uncontrolled with hyperglycemia.  hgbA1c 12.4  Diabetic diet and insulin sliding scale.  Continue current management and adjust scheduled insulin as needed.      HLD (hyperlipidemia)  Continue home statin    Essential hypertension  Holding home losartan with ARF.  Continue hydralazine prn  Increasing BP.  Started Norvasc.        VTE Risk Mitigation (From admission, onward)         Ordered     heparin (porcine) injection 5,000 Units  Every 8 hours      07/26/20 1313     Place CHRIS hose  Until discontinued      07/24/20 1843     IP VTE HIGH RISK PATIENT  Once      07/24/20 1801     Place sequential compression device  Until discontinued      07/24/20 1801                      Obed Carroll MD  Department of Hospital Medicine   [facility]

## 2020-07-29 NOTE — PROGRESS NOTES
Awake alert oriented NAD    Denies CNS ENT CP GI  RHEUM OR DERM SX  Past Medical History:   Diagnosis Date    Diabetes mellitus     Hypertension      Past Surgical History:   Procedure Laterality Date    INCISION AND DRAINAGE Left 7/25/2020    Procedure: INCISION AND DRAINAGE;  Surgeon: Lion Durham MD;  Location: [facility];  Service: General;  Laterality: Left;  perineal and upper thigh     Review of patient's allergies indicates:  No Known Allergies    Current Facility-Administered Medications   Medication    0.45% NaCl infusion    acetaminophen tablet 650 mg    amLODIPine tablet 5 mg    atorvastatin tablet 20 mg    ceFAZolin (ANCEF) 1 gram in dextrose 5 % 50 mL IVPB (premix)    dextrose 50% injection 12.5 g    dextrose 50% injection 25 g    diphenhydrAMINE capsule 25 mg    famotidine tablet 20 mg    glucagon (human recombinant) injection 1 mg    glucose chewable tablet 16 g    glucose chewable tablet 24 g    heparin (porcine) injection 5,000 Units    hydrALAZINE injection 10 mg    insulin aspart U-100 pen 0-5 Units    insulin aspart U-100 pen 10 Units    insulin detemir U-100 pen 25 Units    morphine injection 2 mg    ondansetron injection 4 mg    oxyCODONE-acetaminophen 5-325 mg per tablet 1 tablet    pantoprazole EC tablet 40 mg    promethazine (PHENERGAN) 6.25 mg in dextrose 5 % 50 mL IVPB    senna-docusate 8.6-50 mg per tablet 1 tablet    sodium chloride 0.9% flush 10 mL    sodium chloride 0.9% flush 10 mL       LABS    Recent Results (from the past 24 hour(s))   POCT glucose    Collection Time: 07/28/20  4:08 PM   Result Value Ref Range    POCT Glucose 192 (H) 70 - 110 mg/dL   POCT glucose    Collection Time: 07/28/20  7:29 PM   Result Value Ref Range    POCT Glucose 192 (H) 70 - 110 mg/dL   Rodgers's Stain, Urine Random    Collection Time: 07/28/20 11:59 PM   Result Value Ref Range    Rodgers's Stain, Ur No eosinophils seen No eosinophils seen   CBC auto differential     Collection Time: 07/29/20  7:28 AM   Result Value Ref Range    WBC 13.90 (H) 3.90 - 12.70 K/uL    RBC 3.30 (L) 4.60 - 6.20 M/uL    Hemoglobin 9.4 (L) 14.0 - 18.0 g/dL    Hematocrit 26.6 (L) 40.0 - 54.0 %    Mean Corpuscular Volume 81 (L) 82 - 98 fL    Mean Corpuscular Hemoglobin 28.5 27.0 - 31.0 pg    Mean Corpuscular Hemoglobin Conc 35.3 32.0 - 36.0 g/dL    RDW 12.3 11.5 - 14.5 %    Platelets 460 (H) 150 - 350 K/uL    MPV 10.6 9.2 - 12.9 fL    Immature Granulocytes 1.8 (H) 0.0 - 0.5 %    Gran # (ANC) 9.7 (H) 1.8 - 7.7 K/uL    Immature Grans (Abs) 0.25 (H) 0.00 - 0.04 K/uL    Lymph # 2.6 1.0 - 4.8 K/uL    Mono # 1.1 (H) 0.3 - 1.0 K/uL    Eos # 0.2 0.0 - 0.5 K/uL    Baso # 0.04 0.00 - 0.20 K/uL    nRBC 0 0 /100 WBC    Gran% 69.4 38.0 - 73.0 %    Lymph% 18.8 18.0 - 48.0 %    Mono% 8.0 4.0 - 15.0 %    Eosinophil% 1.7 0.0 - 8.0 %    Basophil% 0.3 0.0 - 1.9 %    Differential Method Automated    Basic metabolic panel    Collection Time: 07/29/20  7:28 AM   Result Value Ref Range    Sodium 133 (L) 136 - 145 mmol/L    Potassium 3.5 3.5 - 5.1 mmol/L    Chloride 106 95 - 110 mmol/L    CO2 17 (L) 23 - 29 mmol/L    Glucose 223 (H) 70 - 110 mg/dL    BUN, Bld 60 (H) 6 - 20 mg/dL    Creatinine 5.5 (H) 0.5 - 1.4 mg/dL    Calcium 8.4 (L) 8.7 - 10.5 mg/dL    Anion Gap 10 8 - 16 mmol/L    eGFR if African American 14 (A) >60 mL/min/1.73 m^2    eGFR if non African American 12 (A) >60 mL/min/1.73 m^2   POCT glucose    Collection Time: 07/29/20  7:39 AM   Result Value Ref Range    POCT Glucose 215 (H) 70 - 110 mg/dL   ]    I/O last 3 completed shifts:  In: 4971 [P.O.:1557; I.V.:2864; IV Piggyback:550]  Out: 4120 [Urine:4120]    Vitals:    07/29/20 0545 07/29/20 0718 07/29/20 0745 07/29/20 0904   BP:   (!) 158/79    Pulse:   87    Resp: 20 20 19    Temp:   97.7 °F (36.5 °C)    TempSrc:   Oral    SpO2:   98% 98%   Weight:       Height:           No Jvd, Thyromegaly or Lymphadenopathy  Lungs: Fairly clear anteriorly and laterally  Cor: RRR  no G or rubs  Abd: Soft benign good bowel sounds non tender  Ext: No E C C    A)    KI secondary to multiple causes: Out pt aggressive use of NSAIDs, infection, CT abd/pelvis with IV contrast, dm, htn  Creat finally down, great uo  DM  HTN  L thigh infection     P)     Renal Diet  EPO might be needed  Binders might be needed  Adjust all meds to the degree of renal fx  Close follow up I/O and weights  Maintain Hydration

## 2020-07-30 LAB
ANION GAP SERPL CALC-SCNC: 7 MMOL/L (ref 8–16)
BACTERIA SPEC ANAEROBE CULT: NORMAL
BASOPHILS # BLD AUTO: 0.04 K/UL (ref 0–0.2)
BASOPHILS NFR BLD: 0.4 % (ref 0–1.9)
BUN SERPL-MCNC: 52 MG/DL (ref 6–20)
CALCIUM SERPL-MCNC: 8.1 MG/DL (ref 8.7–10.5)
CHLORIDE SERPL-SCNC: 107 MMOL/L (ref 95–110)
CO2 SERPL-SCNC: 20 MMOL/L (ref 23–29)
CREAT SERPL-MCNC: 3.8 MG/DL (ref 0.5–1.4)
DIFFERENTIAL METHOD: ABNORMAL
EOSINOPHIL # BLD AUTO: 0.2 K/UL (ref 0–0.5)
EOSINOPHIL NFR BLD: 2 % (ref 0–8)
ERYTHROCYTE [DISTWIDTH] IN BLOOD BY AUTOMATED COUNT: 12.6 % (ref 11.5–14.5)
EST. GFR  (AFRICAN AMERICAN): 22 ML/MIN/1.73 M^2
EST. GFR  (NON AFRICAN AMERICAN): 19 ML/MIN/1.73 M^2
GLUCOSE SERPL-MCNC: 271 MG/DL (ref 70–110)
HCT VFR BLD AUTO: 26 % (ref 40–54)
HGB BLD-MCNC: 8.8 G/DL (ref 14–18)
IMM GRANULOCYTES # BLD AUTO: 0.22 K/UL (ref 0–0.04)
IMM GRANULOCYTES NFR BLD AUTO: 2 % (ref 0–0.5)
LYMPHOCYTES # BLD AUTO: 2.8 K/UL (ref 1–4.8)
LYMPHOCYTES NFR BLD: 25.7 % (ref 18–48)
MCH RBC QN AUTO: 28 PG (ref 27–31)
MCHC RBC AUTO-ENTMCNC: 33.8 G/DL (ref 32–36)
MCV RBC AUTO: 83 FL (ref 82–98)
MONOCYTES # BLD AUTO: 1 K/UL (ref 0.3–1)
MONOCYTES NFR BLD: 9.2 % (ref 4–15)
NEUTROPHILS # BLD AUTO: 6.6 K/UL (ref 1.8–7.7)
NEUTROPHILS NFR BLD: 60.7 % (ref 38–73)
NRBC BLD-RTO: 0 /100 WBC
PLATELET # BLD AUTO: 375 K/UL (ref 150–350)
PMV BLD AUTO: 9.9 FL (ref 9.2–12.9)
POCT GLUCOSE: 116 MG/DL (ref 70–110)
POCT GLUCOSE: 186 MG/DL (ref 70–110)
POCT GLUCOSE: 229 MG/DL (ref 70–110)
POCT GLUCOSE: 268 MG/DL (ref 70–110)
POTASSIUM SERPL-SCNC: 3.4 MMOL/L (ref 3.5–5.1)
RBC # BLD AUTO: 3.14 M/UL (ref 4.6–6.2)
SODIUM SERPL-SCNC: 134 MMOL/L (ref 136–145)
WBC # BLD AUTO: 10.92 K/UL (ref 3.9–12.7)

## 2020-07-30 PROCEDURE — 85025 COMPLETE CBC W/AUTO DIFF WBC: CPT

## 2020-07-30 PROCEDURE — 11000001 HC ACUTE MED/SURG PRIVATE ROOM

## 2020-07-30 PROCEDURE — 63600175 PHARM REV CODE 636 W HCPCS: Performed by: NURSE PRACTITIONER

## 2020-07-30 PROCEDURE — 25000003 PHARM REV CODE 250: Performed by: HOSPITALIST

## 2020-07-30 PROCEDURE — 63600175 PHARM REV CODE 636 W HCPCS: Performed by: INTERNAL MEDICINE

## 2020-07-30 PROCEDURE — 25000003 PHARM REV CODE 250: Performed by: SURGERY

## 2020-07-30 PROCEDURE — 80048 BASIC METABOLIC PNL TOTAL CA: CPT

## 2020-07-30 PROCEDURE — 97161 PT EVAL LOW COMPLEX 20 MIN: CPT

## 2020-07-30 PROCEDURE — 36415 COLL VENOUS BLD VENIPUNCTURE: CPT

## 2020-07-30 PROCEDURE — 94761 N-INVAS EAR/PLS OXIMETRY MLT: CPT

## 2020-07-30 RX ADMIN — CLONIDINE HYDROCHLORIDE 0.2 MG: 0.1 TABLET ORAL at 12:07

## 2020-07-30 RX ADMIN — SODIUM CHLORIDE: 0.45 INJECTION, SOLUTION INTRAVENOUS at 11:07

## 2020-07-30 RX ADMIN — INSULIN ASPART 10 UNITS: 100 INJECTION, SOLUTION INTRAVENOUS; SUBCUTANEOUS at 05:07

## 2020-07-30 RX ADMIN — OXYCODONE HYDROCHLORIDE AND ACETAMINOPHEN 1 TABLET: 5; 325 TABLET ORAL at 04:07

## 2020-07-30 RX ADMIN — INSULIN ASPART 1 UNITS: 100 INJECTION, SOLUTION INTRAVENOUS; SUBCUTANEOUS at 08:07

## 2020-07-30 RX ADMIN — CEFAZOLIN SODIUM 1 G: 1 SOLUTION INTRAVENOUS at 11:07

## 2020-07-30 RX ADMIN — OXYCODONE HYDROCHLORIDE AND ACETAMINOPHEN 1 TABLET: 5; 325 TABLET ORAL at 12:07

## 2020-07-30 RX ADMIN — SODIUM CHLORIDE: 0.45 INJECTION, SOLUTION INTRAVENOUS at 09:07

## 2020-07-30 RX ADMIN — OXYCODONE HYDROCHLORIDE AND ACETAMINOPHEN 1 TABLET: 5; 325 TABLET ORAL at 08:07

## 2020-07-30 RX ADMIN — INSULIN ASPART 10 UNITS: 100 INJECTION, SOLUTION INTRAVENOUS; SUBCUTANEOUS at 12:07

## 2020-07-30 RX ADMIN — INSULIN ASPART 10 UNITS: 100 INJECTION, SOLUTION INTRAVENOUS; SUBCUTANEOUS at 09:07

## 2020-07-30 RX ADMIN — ATORVASTATIN CALCIUM 20 MG: 10 TABLET, FILM COATED ORAL at 08:07

## 2020-07-30 RX ADMIN — PANTOPRAZOLE SODIUM 40 MG: 40 TABLET, DELAYED RELEASE ORAL at 09:07

## 2020-07-30 RX ADMIN — INSULIN ASPART 3 UNITS: 100 INJECTION, SOLUTION INTRAVENOUS; SUBCUTANEOUS at 09:07

## 2020-07-30 RX ADMIN — AMLODIPINE BESYLATE 10 MG: 5 TABLET ORAL at 09:07

## 2020-07-30 RX ADMIN — HEPARIN SODIUM 5000 UNITS: 5000 INJECTION INTRAVENOUS; SUBCUTANEOUS at 01:07

## 2020-07-30 NOTE — ASSESSMENT & PLAN NOTE
"Cr of 2.1 on arrival.  Reports chronic use of NSAID "all kinds" for pain   Appreciate Nephrology consult.  Probable ATN from infection and HERSON  Vanc stopped.  Avoid nephrotoxic medication.  Creat peaked and has finally started to improve, continue to monitor            "

## 2020-07-30 NOTE — NURSING
MD notified of elevated BP. New order given for Hydralazine 10 mg IV once. Administered as ordered.

## 2020-07-30 NOTE — PROGRESS NOTES
Surgery Progress Note    Primary Problem: Necrotizing fasciitis    Other problems:   Active Hospital Problems    Diagnosis  POA    *Necrotizing fasciitis [M72.6]  Yes    MSSA (methicillin susceptible Staphylococcus aureus) [A49.01]  Yes    Tobacco user [Z72.0]  Yes    Anemia due to chronic kidney disease [N18.9, D63.1]  Yes    Essential hypertension [I10]  Yes    HLD (hyperlipidemia) [E78.5]  Yes    Diabetes mellitus type 1 [E10.9]  Yes    NORAH (acute kidney injury) [N17.9]  Yes    Sepsis [A41.9]  Yes      Resolved Hospital Problems   No resolved problems to display.       HD #  LOS: 5 days   POD #5 Days Post-Op I and D posterior thigh     Overnight events:  Pain continues to improve.  Denies any fevers or chills.        Diet - Diet diabetic [facility]; 2000 Calorie; Renal     I/O last 3 completed shifts:  In: 2516 [P.O.:957; I.V.:1509; IV Piggyback:50]  Out: 5920 [Urine:5920]    Intake/Output Summary (Last 24 hours) at 7/30/2020 0628  Last data filed at 7/30/2020 0000  Gross per 24 hour   Intake 360 ml   Output 3300 ml   Net -2940 ml       Temp:  [97.1 °F (36.2 °C)-98.5 °F (36.9 °C)] 98.5 °F (36.9 °C)  Pulse:  [81-98] 82  Resp:  [18-20] 18  SpO2:  [97 %-99 %] 97 %  BP: (131-195)/(62-95) 141/63    Gen: alert, well appearing, and in no distress,    Left thigh without erythema  Recent Labs   Lab 07/24/20  1536 07/25/20  0528 07/25/20  1807 07/26/20  0520 07/27/20  0448 07/27/20  0449 07/28/20  0509 07/29/20  0728   WBC  --  22.00*  --  26.45* 24.94*  --  18.54* 13.90*   HGB  --  9.8*  --  9.5* 9.5*  --  9.6* 9.4*   HCT  --  28.7*  --  28.2* 28.1*  --  27.8* 26.6*   PLT  --  194  --  212 263  --  320 460*   * 128* 127* 130*  --  127* 128* 133*   K 3.4* 3.4* 4.2 3.8  --  3.5 3.2* 3.5    99 99 98  --  95 98 106   BUN 33* 37* 44* 55*  --  68* 67* 60*   * 249* 198* 272*  --  227* 174* 223*   PROT 6.4 5.8*  --   --   --  5.8*  --   --    ALBUMIN 1.6* 1.5* 1.4* 1.3*  --  1.4*  --   --     BILITOT 0.3 0.2  --   --   --  0.1  --   --    AST 12 10  --   --   --  14  --   --    ALKPHOS 106 156*  --   --   --  122  --   --    ALT 6* 9*  --   --   --  11  --   --         Assessment:  Status post I and D soft tissue infection    Plan: Continue local wound care, wet-to-dry dressing changes b.i.d.  Continue antibiotics per primary  If WBC continues to improve okay with discharge with local wound care  Can follow up with Dr. Durham in 2 weeks in clinic      Brodie Wright MD    Addendum:  WBC normalized  Ok with discharge home with local wound care and po abx

## 2020-07-30 NOTE — PLAN OF CARE
Pt AAOx4, VSS on RA, voids without difficulty per urinal, L groin wound care and dressing change done per order, scheduled meds administered, cardiac monitoring dc per order, Diet - diabetic/renal, remains free of fall.    Problem: Fall Injury Risk  Goal: Absence of Fall and Fall-Related Injury  Outcome: Ongoing, Progressing  Intervention: Identify and Manage Contributors to Fall Injury Risk  Flowsheets (Taken 7/30/2020 1457)  Self-Care Promotion:   independence encouraged   BADL personal objects within reach   BADL personal routines maintained  Medication Review/Management: medications reviewed  Intervention: Promote Injury-Free Environment  Flowsheets (Taken 7/30/2020 1457)  Safety Promotion/Fall Prevention:   assistive device/personal item within reach   side rails raised x 2   medications reviewed   nonskid shoes/socks when out of bed   Fall Risk reviewed with patient/family  Environmental Safety Modification:   assistive device/personal items within reach   clutter free environment maintained     Problem: Diabetes Comorbidity  Goal: Blood Glucose Level Within Desired Range  Outcome: Ongoing, Progressing  Intervention: Maintain Glycemic Control  Flowsheets (Taken 7/30/2020 1457)  Glycemic Management: blood glucose monitoring     Problem: Glycemic Control Impaired (Sepsis/Septic Shock)  Goal: Blood Glucose Level Within Desired Range  Outcome: Ongoing, Progressing  Intervention: Optimize Glycemic Control  Flowsheets (Taken 7/30/2020 1457)  Glycemic Management: blood glucose monitoring     Problem: Infection (Sepsis/Septic Shock)  Goal: Absence of Infection Signs/Symptoms  Outcome: Ongoing, Progressing  Intervention: Prevent or Manage Infection Progression  Flowsheets (Taken 7/30/2020 1457)  Infection Prevention: single patient room provided  Fever Reduction/Comfort Measures: medication administered  Infection Management: aseptic technique maintained     Problem: Infection  Goal: Infection Symptom  Resolution  Outcome: Ongoing, Progressing  Intervention: Prevent or Manage Infection  Flowsheets (Taken 7/30/2020 8847)  Fever Reduction/Comfort Measures: medication administered  Infection Management: aseptic technique maintained

## 2020-07-30 NOTE — NURSING
Pt aa&o x4. Able to make needs known. PRN pain medication adm with positive results. 0.2mg clonidine adm for elevated b/p. Phenergan adm for n/v. Patient denies sob/n/v/distress/pain at this time. Refused to allow me to change dressing. Remained free from falls and injuries this shift. Will continue to monitor.

## 2020-07-30 NOTE — SUBJECTIVE & OBJECTIVE
Interval History: still some pain at the surgical site but otherwise is feeling well. In good spirits that his kidneys are looking better    Review of Systems   HENT: Negative for ear discharge and ear pain.    Eyes: Negative for discharge and itching.   Endocrine: Negative for cold intolerance and heat intolerance.   Neurological: Negative for seizures and syncope.     Objective:     Vital Signs (Most Recent):  Temp: 98.1 °F (36.7 °C) (07/30/20 1132)  Pulse: 84 (07/30/20 1132)  Resp: 18 (07/30/20 1132)  BP: (!) 142/74 (07/30/20 1132)  SpO2: 99 % (07/30/20 1132) Vital Signs (24h Range):  Temp:  [97.1 °F (36.2 °C)-98.7 °F (37.1 °C)] 98.1 °F (36.7 °C)  Pulse:  [81-97] 84  Resp:  [18-20] 18  SpO2:  [97 %-99 %] 99 %  BP: (131-192)/(62-95) 142/74     Weight: 102.1 kg (225 lb)  Body mass index is 28.89 kg/m².    Intake/Output Summary (Last 24 hours) at 7/30/2020 1413  Last data filed at 7/30/2020 1218  Gross per 24 hour   Intake 2850.42 ml   Output 3400 ml   Net -549.58 ml      Physical Exam  Vitals signs and nursing note reviewed.   Constitutional:       General: He is not in acute distress.     Appearance: He is well-developed.   HENT:      Head: Normocephalic and atraumatic.      Right Ear: External ear normal.      Left Ear: External ear normal.   Eyes:      General:         Right eye: No discharge.         Left eye: No discharge.      Conjunctiva/sclera: Conjunctivae normal.   Neck:      Musculoskeletal: Normal range of motion.      Thyroid: No thyromegaly.   Cardiovascular:      Rate and Rhythm: Normal rate and regular rhythm.      Heart sounds: No murmur.   Pulmonary:      Effort: Pulmonary effort is normal. No respiratory distress.      Breath sounds: Normal breath sounds.   Abdominal:      General: Bowel sounds are normal. There is no distension.      Palpations: Abdomen is soft. There is no mass.      Tenderness: There is no abdominal tenderness.   Musculoskeletal:         General: No deformity.   Skin:      General: Skin is warm and dry.      Comments: Wound packing noted with serosang material on guaze.  Tender to touch and mild surrounding induration   Neurological:      Mental Status: He is alert and oriented to person, place, and time.   Psychiatric:         Behavior: Behavior normal.         Significant Labs: All pertinent labs within the past 24 hours have been reviewed.    Significant Imaging: I have reviewed and interpreted all pertinent imaging results/findings within the past 24 hours.

## 2020-07-30 NOTE — PROGRESS NOTES
[facility] Hospital Medicine  Progress Note    Patient Name: Chas Henao  MRN: [id number]  Patient Class: IP- Inpatient   Admission Date: 7/24/2020  Length of Stay: 5 days  Attending Physician: Jules Goodwin MD  Primary Care Provider: Primary Doctor No        Subjective:     Principal Problem:Necrotizing fasciitis        HPI:  Chas Henao 35 y.o. male with type 1 diabetes, HTN, and HLD presents to the hospital with a chief complaint of leg pain. He reports 1 week ago he saw what he thought was a pimple to his left medial thigh that he attempted to express without success and the abscess expanded. Over the last week he has experienced an increasing constant throbbing pain without radiation improved with I&D of abscess in the ED and worsened with ambulation. He denies any trauma or injury to the leg. He has not lost control of his bowel/bladder. He denies fever, chest pain, SOB, N/V, abdominal pain, dysuria, dizziness, syncope.     In the ED, tachycardic to 105, WBC of 22, abscess seen on CT, lactic acid normal, Cr of 2.1.     Overview/Hospital Course:  Chas Henoa 35 y.o. male placed in observation for left upper thigh abscess, NORAH and sepsis.  I&D in ED. Rocephin/Flagyl/doxy started on admission. Patient received CT with contrast on admission. General surgery and Nephrology consulted. US renal showed normal side kidneys, bilateral kidneys and bilateral elevated renal indices. 7/25 Underwent I & D of left upper thigh area that showed some necrotizing fasciitis.  Antibiotics switched to vancomycin and meropenem IV.  Renal function continued to worsen.  On 07/26 urine output approximately 500 mL over 24 hr. Fever subsided, but persistent leukocytosis. Nephrology felt ATN and continue IVF.  Vanc stopped and patient started on Ancef.  ID recommending Ancef while inpatient and Cefadroxil upon discharge.  CT with no fluid collections.  Improving leukocytosis and Creat finally trending  downwards.    Interval History: still some pain at the surgical site but otherwise is feeling well. In good spirits that his kidneys are looking better    Review of Systems   HENT: Negative for ear discharge and ear pain.    Eyes: Negative for discharge and itching.   Endocrine: Negative for cold intolerance and heat intolerance.   Neurological: Negative for seizures and syncope.     Objective:     Vital Signs (Most Recent):  Temp: 98.1 °F (36.7 °C) (07/30/20 1132)  Pulse: 84 (07/30/20 1132)  Resp: 18 (07/30/20 1132)  BP: (!) 142/74 (07/30/20 1132)  SpO2: 99 % (07/30/20 1132) Vital Signs (24h Range):  Temp:  [97.1 °F (36.2 °C)-98.7 °F (37.1 °C)] 98.1 °F (36.7 °C)  Pulse:  [81-97] 84  Resp:  [18-20] 18  SpO2:  [97 %-99 %] 99 %  BP: (131-192)/(62-95) 142/74     Weight: 102.1 kg (225 lb)  Body mass index is 28.89 kg/m².    Intake/Output Summary (Last 24 hours) at 7/30/2020 1413  Last data filed at 7/30/2020 1218  Gross per 24 hour   Intake 2850.42 ml   Output 3400 ml   Net -549.58 ml      Physical Exam  Vitals signs and nursing note reviewed.   Constitutional:       General: He is not in acute distress.     Appearance: He is well-developed.   HENT:      Head: Normocephalic and atraumatic.      Right Ear: External ear normal.      Left Ear: External ear normal.   Eyes:      General:         Right eye: No discharge.         Left eye: No discharge.      Conjunctiva/sclera: Conjunctivae normal.   Neck:      Musculoskeletal: Normal range of motion.      Thyroid: No thyromegaly.   Cardiovascular:      Rate and Rhythm: Normal rate and regular rhythm.      Heart sounds: No murmur.   Pulmonary:      Effort: Pulmonary effort is normal. No respiratory distress.      Breath sounds: Normal breath sounds.   Abdominal:      General: Bowel sounds are normal. There is no distension.      Palpations: Abdomen is soft. There is no mass.      Tenderness: There is no abdominal tenderness.   Musculoskeletal:         General: No deformity.  "  Skin:     General: Skin is warm and dry.      Comments: Wound packing noted with serosang material on guaze.  Tender to touch and mild surrounding induration   Neurological:      Mental Status: He is alert and oriented to person, place, and time.   Psychiatric:         Behavior: Behavior normal.         Significant Labs: All pertinent labs within the past 24 hours have been reviewed.    Significant Imaging: I have reviewed and interpreted all pertinent imaging results/findings within the past 24 hours.      Assessment/Plan:      * Necrotizing fasciitis  -Patient admitted to hospital for left upper thigh abscess  -CT with "Findings suggesting cellulitis involving the posteromedial proximal thigh soft tissues, with subcutaneous strand-like edema, extending to the fascial surface.  There is punctate subcutaneous emphysema within the superficial subcutaneous fat, adjacent to a small amount of disorganized fluid.  No discrete formed abscess at this time, however developing abscess is a consideration versus previously drained abscess."  - on admit Started on Rocephin/Doxy/Flagyl as CKD and received IV contrast  - 7/25 switched to Vanc and meropenem-Pharmacy following ; ID consulted  - 7/25 I&D of left upper thigh showed some necrotizing fascitis that was minimal with viable muscle  Vanc stopped and ABx's switched to Ancef.  Surgery continuing to monitor.  Repeat CT with no fluid collections.  Appreciate ID input.  Ancef while inpatient and can change to oral Cefadroxil (until 8/7) upon discharge.  Improving leukocytosis.  Will get PT/OT eval for discharge planning        Tobacco user  Smokes 1/5 pack a week. Encourage smoking cessation. Declines nicotine patch. Not craving.      Anemia due to chronic kidney disease  H/H stable over past few days.  No evidence of bleeding.      Sepsis  Secondary to above.  Sepsis secondary to Staph    NORAH (acute kidney injury)  Cr of 2.1 on arrival.  Reports chronic use of NSAID "all " "kinds" for pain   Appreciate Nephrology consult.  Probable ATN from infection and HERSON  Vanc stopped.  Avoid nephrotoxic medication.  Creat peaked and has finally started to improve, continue to monitor              Diabetes mellitus type 1  Uncontrolled with hyperglycemia.  hgbA1c 12.4  Diabetic diet and insulin sliding scale.  Continue current management and adjust scheduled insulin as needed.      HLD (hyperlipidemia)  Continue home statin    Essential hypertension  Holding home losartan with ARF.  Continue hydralazine prn  Increasing BP.  Started Norvasc.        VTE Risk Mitigation (From admission, onward)         Ordered     heparin (porcine) injection 5,000 Units  Every 8 hours      07/26/20 1313     Place CHRIS hose  Until discontinued      07/24/20 1843     IP VTE HIGH RISK PATIENT  Once      07/24/20 1801     Place sequential compression device  Until discontinued      07/24/20 1801                      Jules Goodwin MD  Department of Hospital Medicine   [facility]    "

## 2020-07-30 NOTE — PROGRESS NOTES
Awake alert oriented NAD    Denies CNS ENT CP GI  RHEUM OR DERM SX  Past Medical History:   Diagnosis Date    Diabetes mellitus     Hypertension      Past Surgical History:   Procedure Laterality Date    INCISION AND DRAINAGE Left 7/25/2020    Procedure: INCISION AND DRAINAGE;  Surgeon: Lion Durham MD;  Location: [facility];  Service: General;  Laterality: Left;  perineal and upper thigh     Review of patient's allergies indicates:  No Known Allergies    Current Facility-Administered Medications   Medication    0.45% NaCl infusion    acetaminophen tablet 650 mg    amLODIPine tablet 10 mg    atorvastatin tablet 20 mg    ceFAZolin (ANCEF) 1 gram in dextrose 5 % 50 mL IVPB (premix)    cloNIDine tablet 0.2 mg    dextrose 50% injection 12.5 g    dextrose 50% injection 25 g    diphenhydrAMINE capsule 25 mg    famotidine tablet 20 mg    glucagon (human recombinant) injection 1 mg    glucose chewable tablet 16 g    glucose chewable tablet 24 g    heparin (porcine) injection 5,000 Units    insulin aspart U-100 pen 0-5 Units    insulin aspart U-100 pen 10 Units    insulin detemir U-100 pen 30 Units    morphine injection 2 mg    ondansetron injection 4 mg    oxyCODONE-acetaminophen 5-325 mg per tablet 1 tablet    pantoprazole EC tablet 40 mg    promethazine (PHENERGAN) 6.25 mg in dextrose 5 % 50 mL IVPB    senna-docusate 8.6-50 mg per tablet 1 tablet    sodium chloride 0.9% flush 10 mL    sodium chloride 0.9% flush 10 mL       LABS    Recent Results (from the past 24 hour(s))   POCT glucose    Collection Time: 07/29/20  4:11 PM   Result Value Ref Range    POCT Glucose 120 (H) 70 - 110 mg/dL   POCT glucose    Collection Time: 07/29/20  9:05 PM   Result Value Ref Range    POCT Glucose 229 (H) 70 - 110 mg/dL   CBC auto differential    Collection Time: 07/30/20  6:10 AM   Result Value Ref Range    WBC 10.92 3.90 - 12.70 K/uL    RBC 3.14 (L) 4.60 - 6.20 M/uL    Hemoglobin 8.8 (L) 14.0 - 18.0 g/dL     Hematocrit 26.0 (L) 40.0 - 54.0 %    Mean Corpuscular Volume 83 82 - 98 fL    Mean Corpuscular Hemoglobin 28.0 27.0 - 31.0 pg    Mean Corpuscular Hemoglobin Conc 33.8 32.0 - 36.0 g/dL    RDW 12.6 11.5 - 14.5 %    Platelets 375 (H) 150 - 350 K/uL    MPV 9.9 9.2 - 12.9 fL    Immature Granulocytes 2.0 (H) 0.0 - 0.5 %    Gran # (ANC) 6.6 1.8 - 7.7 K/uL    Immature Grans (Abs) 0.22 (H) 0.00 - 0.04 K/uL    Lymph # 2.8 1.0 - 4.8 K/uL    Mono # 1.0 0.3 - 1.0 K/uL    Eos # 0.2 0.0 - 0.5 K/uL    Baso # 0.04 0.00 - 0.20 K/uL    nRBC 0 0 /100 WBC    Gran% 60.7 38.0 - 73.0 %    Lymph% 25.7 18.0 - 48.0 %    Mono% 9.2 4.0 - 15.0 %    Eosinophil% 2.0 0.0 - 8.0 %    Basophil% 0.4 0.0 - 1.9 %    Differential Method Automated    Basic metabolic panel    Collection Time: 07/30/20  6:10 AM   Result Value Ref Range    Sodium 134 (L) 136 - 145 mmol/L    Potassium 3.4 (L) 3.5 - 5.1 mmol/L    Chloride 107 95 - 110 mmol/L    CO2 20 (L) 23 - 29 mmol/L    Glucose 271 (H) 70 - 110 mg/dL    BUN, Bld 52 (H) 6 - 20 mg/dL    Creatinine 3.8 (H) 0.5 - 1.4 mg/dL    Calcium 8.1 (L) 8.7 - 10.5 mg/dL    Anion Gap 7 (L) 8 - 16 mmol/L    eGFR if African American 22 (A) >60 mL/min/1.73 m^2    eGFR if non African American 19 (A) >60 mL/min/1.73 m^2   POCT glucose    Collection Time: 07/30/20  7:45 AM   Result Value Ref Range    POCT Glucose 268 (H) 70 - 110 mg/dL   POCT glucose    Collection Time: 07/30/20 11:34 AM   Result Value Ref Range    POCT Glucose 116 (H) 70 - 110 mg/dL   ]    I/O last 3 completed shifts:  In: 1919 [P.O.:360; I.V.:1509; IV Piggyback:50]  Out: 5820 [Urine:5820]    Vitals:    07/30/20 0213 07/30/20 0533 07/30/20 0750 07/30/20 1132   BP: (!) 166/70 (!) 141/63 (!) 147/76 (!) 142/74   Pulse:  82 81 84   Resp:  18 18 18   Temp:  98.5 °F (36.9 °C) 98.7 °F (37.1 °C) 98.1 °F (36.7 °C)   TempSrc:  Oral Oral Oral   SpO2:  97% 98% 99%   Weight:       Height:           No Jvd, Thyromegaly or Lymphadenopathy  Lungs: Fairly clear anteriorly and  laterally  Cor: RRR no G or rubs  Abd: Soft benign good bowel sounds non tender  Ext: No E C C    A)  NORAH secondary to multiple causes: Out pt aggressive use of NSAIDs, infection, CT abd/pelvis with IV contrast, dm, htn  Creat finally down some more, great uo    Renal wise ok to dc with creat bellow 3    DM  HTN  L thigh infection     P)     Renal Diet  EPO might be needed  Binders might be needed  Adjust all meds to the degree of renal fx  Close follow up I/O and weights  Maintain Hydration

## 2020-07-30 NOTE — PT/OT/SLP EVAL
Physical Therapy Evaluation and Discharge Note    Patient Name:  Chas Henao   MRN:  [id number]    Recommendations:     Discharge Recommendations:  home   Discharge Equipment Recommendations: cane, straight   Barriers to discharge: None    Assessment:     Chas Henao is a 35 y.o. male admitted with a medical diagnosis of Necrotizing fasciitis. .  At this time, patient is functioning at their prior level of function and does not require further acute PT services.     Recent Surgery: Procedure(s) (LRB):  INCISION AND DRAINAGE (Left) 5 Days Post-Op    Plan:     During this hospitalization, patient does not require further acute PT services.  Please re-consult if situation changes.      Subjective     Chief Complaint: pain  Patient/Family Comments/goals: agreed to walk  Pain/Comfort:  · Pain Rating 1: 7/10  · Location - Side 1: Left  · Location - Orientation 1: upper  · Location 1: thigh  · Pain Addressed 1: Reposition, Cessation of Activity     Pt stated he has been ambulating in his room with pain as limiting factor    Patients cultural, spiritual, Denominational conflicts given the current situation: no    Living Environment:  Home with 3 TATY  Prior to admission, patients level of function was ind with all.  Equipment used at home: none.  DME owned (not currently used): none.  Upon discharge, patient will have assistance from mother  .    Objective:     Communicated with RN prior to session.  Patient found HOB elevated with peripheral IV upon PT entry to room.    General Precautions: Standard,     Orthopedic Precautions:N/A   Braces: N/A     Exams:  · Cognitive Exam:  Patient is oriented to Person, Place, Time and Situation  · Gross Motor Coordination:  WFL  · Postural Exam:  Patient presented with the following abnormalities:    · -       No postural abnormalities identified  · Sensation:    · -       Intact  · Skin Integrity/Edema:      · -       did not visualize wounds  · RUE ROM: WFL  · RUE Strength:  WFL  · LUE ROM: WFL  · LUE Strength: WFL  · RLE ROM: WFL  · RLE Strength: WFL  · LLE ROM: WFL  · LLE Strength: WFL    Functional Mobility:  · Bed Mobility:     · Supine to Sit: modified independence  · Transfers:     · Sit to Stand:  modified independence with no AD  · Gait: with SPC ~ 30 ft mod Ind; progressive pain, declined to amb further.   · Trained in holding cane in R hand to offload LLE  · Pt demo'd good sequencing with cane without any cues needed  · Balance: good standing unsupported    AM-PAC 6 CLICK MOBILITY  Total Score:24       Therapeutic Activities and Exercises:   n/a    AM-PAC 6 CLICK MOBILITY  Total Score:24     Patient left HOB elevated with call button in reach.    GOALS:   Multidisciplinary Problems     Physical Therapy Goals        Problem: Physical Therapy Goal    Goal Priority Disciplines Outcome Goal Variances Interventions   Physical Therapy Goal     PT, PT/OT                      History:     Past Medical History:   Diagnosis Date    Diabetes mellitus     Hypertension        Past Surgical History:   Procedure Laterality Date    INCISION AND DRAINAGE Left 7/25/2020    Procedure: INCISION AND DRAINAGE;  Surgeon: Lion Durham MD;  Location: [facility];  Service: General;  Laterality: Left;  perineal and upper thigh       Time Tracking:     PT Received On: 07/30/20  PT Start Time: 1627     PT Stop Time: 1639  PT Total Time (min): 12 min     Billable Minutes: Evaluation 12      Irma Ferrari PT  07/30/2020

## 2020-07-30 NOTE — ASSESSMENT & PLAN NOTE
"-Patient admitted to hospital for left upper thigh abscess  -CT with "Findings suggesting cellulitis involving the posteromedial proximal thigh soft tissues, with subcutaneous strand-like edema, extending to the fascial surface.  There is punctate subcutaneous emphysema within the superficial subcutaneous fat, adjacent to a small amount of disorganized fluid.  No discrete formed abscess at this time, however developing abscess is a consideration versus previously drained abscess."  - on admit Started on Rocephin/Doxy/Flagyl as CKD and received IV contrast  - 7/25 switched to Vanc and meropenem-Pharmacy following ; ID consulted  - 7/25 I&D of left upper thigh showed some necrotizing fascitis that was minimal with viable muscle  Vanc stopped and ABx's switched to Ancef.  Surgery continuing to monitor.  Repeat CT with no fluid collections.  Appreciate ID input.  Ancef while inpatient and can change to oral Cefadroxil (until 8/7) upon discharge.  Improving leukocytosis.  Will get PT/OT eval for discharge planning      "

## 2020-07-30 NOTE — PLAN OF CARE
Problem: Adult Inpatient Plan of Care  Goal: Optimal Comfort and Wellbeing  Outcome: Met    Recommend single point cane  No further PT needs.

## 2020-07-31 VITALS
RESPIRATION RATE: 18 BRPM | TEMPERATURE: 98 F | BODY MASS INDEX: 28.88 KG/M2 | WEIGHT: 225 LBS | HEIGHT: 74 IN | SYSTOLIC BLOOD PRESSURE: 144 MMHG | HEART RATE: 98 BPM | DIASTOLIC BLOOD PRESSURE: 67 MMHG | OXYGEN SATURATION: 98 %

## 2020-07-31 LAB
ANION GAP SERPL CALC-SCNC: 7 MMOL/L (ref 8–16)
BASOPHILS # BLD AUTO: 0.04 K/UL (ref 0–0.2)
BASOPHILS NFR BLD: 0.3 % (ref 0–1.9)
BUN SERPL-MCNC: 41 MG/DL (ref 6–20)
CALCIUM SERPL-MCNC: 7.9 MG/DL (ref 8.7–10.5)
CHLORIDE SERPL-SCNC: 111 MMOL/L (ref 95–110)
CO2 SERPL-SCNC: 21 MMOL/L (ref 23–29)
CREAT SERPL-MCNC: 2.9 MG/DL (ref 0.5–1.4)
DIFFERENTIAL METHOD: ABNORMAL
EOSINOPHIL # BLD AUTO: 0.3 K/UL (ref 0–0.5)
EOSINOPHIL NFR BLD: 2.1 % (ref 0–8)
ERYTHROCYTE [DISTWIDTH] IN BLOOD BY AUTOMATED COUNT: 12.8 % (ref 11.5–14.5)
EST. GFR  (AFRICAN AMERICAN): 31 ML/MIN/1.73 M^2
EST. GFR  (NON AFRICAN AMERICAN): 27 ML/MIN/1.73 M^2
GLUCOSE SERPL-MCNC: 259 MG/DL (ref 70–110)
HCT VFR BLD AUTO: 27.6 % (ref 40–54)
HGB BLD-MCNC: 9.3 G/DL (ref 14–18)
IMM GRANULOCYTES # BLD AUTO: 0.25 K/UL (ref 0–0.04)
IMM GRANULOCYTES NFR BLD AUTO: 1.9 % (ref 0–0.5)
LYMPHOCYTES # BLD AUTO: 3.4 K/UL (ref 1–4.8)
LYMPHOCYTES NFR BLD: 26.1 % (ref 18–48)
MCH RBC QN AUTO: 27.9 PG (ref 27–31)
MCHC RBC AUTO-ENTMCNC: 33.7 G/DL (ref 32–36)
MCV RBC AUTO: 83 FL (ref 82–98)
MONOCYTES # BLD AUTO: 1.3 K/UL (ref 0.3–1)
MONOCYTES NFR BLD: 9.6 % (ref 4–15)
NEUTROPHILS # BLD AUTO: 7.8 K/UL (ref 1.8–7.7)
NEUTROPHILS NFR BLD: 60 % (ref 38–73)
NRBC BLD-RTO: 0 /100 WBC
PLATELET # BLD AUTO: 422 K/UL (ref 150–350)
PMV BLD AUTO: 10 FL (ref 9.2–12.9)
POCT GLUCOSE: 169 MG/DL (ref 70–110)
POCT GLUCOSE: 259 MG/DL (ref 70–110)
POTASSIUM SERPL-SCNC: 3.7 MMOL/L (ref 3.5–5.1)
RBC # BLD AUTO: 3.33 M/UL (ref 4.6–6.2)
SODIUM SERPL-SCNC: 139 MMOL/L (ref 136–145)
WBC # BLD AUTO: 13.06 K/UL (ref 3.9–12.7)

## 2020-07-31 PROCEDURE — 97165 OT EVAL LOW COMPLEX 30 MIN: CPT

## 2020-07-31 PROCEDURE — 25000003 PHARM REV CODE 250: Performed by: HOSPITALIST

## 2020-07-31 PROCEDURE — 36415 COLL VENOUS BLD VENIPUNCTURE: CPT

## 2020-07-31 PROCEDURE — 25000003 PHARM REV CODE 250: Performed by: SURGERY

## 2020-07-31 PROCEDURE — 85025 COMPLETE CBC W/AUTO DIFF WBC: CPT

## 2020-07-31 PROCEDURE — 63600175 PHARM REV CODE 636 W HCPCS: Performed by: INTERNAL MEDICINE

## 2020-07-31 PROCEDURE — 80048 BASIC METABOLIC PNL TOTAL CA: CPT

## 2020-07-31 RX ORDER — AMOXICILLIN 250 MG
1 CAPSULE ORAL DAILY PRN
Qty: 30 TABLET | Refills: 0 | Status: SHIPPED | OUTPATIENT
Start: 2020-07-31

## 2020-07-31 RX ORDER — ONDANSETRON 4 MG/1
4 TABLET, FILM COATED ORAL 2 TIMES DAILY
Qty: 21 TABLET | Refills: 0 | Status: SHIPPED | OUTPATIENT
Start: 2020-07-31

## 2020-07-31 RX ORDER — AMLODIPINE BESYLATE 10 MG/1
10 TABLET ORAL DAILY
Qty: 30 TABLET | Refills: 11 | Status: SHIPPED | OUTPATIENT
Start: 2020-08-01 | End: 2021-08-01

## 2020-07-31 RX ORDER — OXYCODONE AND ACETAMINOPHEN 5; 325 MG/1; MG/1
1 TABLET ORAL EVERY 4 HOURS PRN
Qty: 21 TABLET | Refills: 0 | Status: SHIPPED | OUTPATIENT
Start: 2020-07-31 | End: 2020-08-07

## 2020-07-31 RX ORDER — CEFADROXIL 500 MG/1
1 CAPSULE ORAL EVERY 12 HOURS
Qty: 28 CAPSULE | Refills: 0 | Status: SHIPPED | OUTPATIENT
Start: 2020-07-31 | End: 2020-08-07

## 2020-07-31 RX ADMIN — OXYCODONE HYDROCHLORIDE AND ACETAMINOPHEN 1 TABLET: 5; 325 TABLET ORAL at 02:07

## 2020-07-31 RX ADMIN — CEFAZOLIN SODIUM 1 G: 1 SOLUTION INTRAVENOUS at 11:07

## 2020-07-31 RX ADMIN — INSULIN ASPART 3 UNITS: 100 INJECTION, SOLUTION INTRAVENOUS; SUBCUTANEOUS at 09:07

## 2020-07-31 RX ADMIN — PANTOPRAZOLE SODIUM 40 MG: 40 TABLET, DELAYED RELEASE ORAL at 09:07

## 2020-07-31 RX ADMIN — AMLODIPINE BESYLATE 10 MG: 5 TABLET ORAL at 09:07

## 2020-07-31 RX ADMIN — INSULIN ASPART 10 UNITS: 100 INJECTION, SOLUTION INTRAVENOUS; SUBCUTANEOUS at 09:07

## 2020-07-31 RX ADMIN — INSULIN ASPART 10 UNITS: 100 INJECTION, SOLUTION INTRAVENOUS; SUBCUTANEOUS at 12:07

## 2020-07-31 RX ADMIN — OXYCODONE HYDROCHLORIDE AND ACETAMINOPHEN 1 TABLET: 5; 325 TABLET ORAL at 09:07

## 2020-07-31 NOTE — PT/OT/SLP EVAL
Occupational Therapy   Evaluation and Discharge Note    Name: Chas Henao  MRN: [id number]  Admitting Diagnosis:  Necrotizing fasciitis 6 Days Post-Op    Recommendations:     Discharge Recommendations: home  Discharge Equipment Recommendations:  none  Barriers to discharge:  None    Assessment:     Chas Henao is a 35 y.o. male with a medical diagnosis of Necrotizing fasciitis. At this time, patient is functioning at their prior level of function and does not require further acute OT services.   The patient denies need for DME at home. The patient was provided a handout re: Home Safety. OT will D/C.          Plan:     During this hospitalization, patient does not require further acute OT services.  Please re-consult if situation changes.    · Plan of Care Reviewed with: patient    Subjective     Chief Complaint: ready for discharge  Patient/Family Comments/goals: go home    Occupational Profile:  Living Environment: Home with 3 TATY  Previous level of function: (I) ADL and amb without AD  Equipment Used at home:  none  Assistance upon Discharge: family    Pain/Comfort:  · Pain Rating 1: (yes-did not rate)  · Location - Side 1: Left  · Location 1: thigh  · Pain Addressed 1: Cessation of Activity    Patients cultural, spiritual, Sabianism conflicts given the current situation: no    Objective:       Patient found ambulating from the bathroom with peripheral IV upon OT entry to room.    General Precautions: Standard, fall   Orthopedic Precautions:N/A   Braces: N/A     Occupational Performance:    Bed Mobility:    · N/T    Functional Mobility/Transfers:  · Patient completed Sit <> Stand Transfer with independence  with  no assistive device   · Functional Mobility: The patient amb without AD in his room.    Activities of Daily Living:  · Bathing: modified independence The patient completed a shower afet set up from nursing just prior to OT  · Upper Body Dressing: modified independence    · Lower Body Dressing:  modified independence to don socks and slippers while seated on the EOB    Cognitive/Visual Perceptual:  Cognitive/Psychosocial Skills:     -       Oriented to: Person, Place, Time and Situation   -       Follows Commands/attention:Follows multistep  commands  -       Communication: clear/fluent  -       Memory: No Deficits noted  -       Safety awareness/insight to disability: intact   -       Mood/Affect/Coping skills/emotional control: Appropriate to situation    Physical Exam:  Balance: -       good  Postural examination/scapula alignment:    -       No postural abnormalities identified  Skin integrity: Visible skin intact and left thigh wound not visualized  Upper Extremity Range of Motion:     -       Right Upper Extremity: WFL  -       Left Upper Extremity: WFL  Upper Extremity Strength:    -       Right Upper Extremity: WFL  -       Left Upper Extremity: WFL    AMPAC 6 Click ADL:  AMPAC Total Score: 24    Treatment & Education:  · Pt educated on OT role/POC.   · Importance of sitting up in the chair throughout the day as tolerated, especially for meals   · The patient was educated via handout for home safety.    Patient left seated ion the EOB with all lines intact and call button in reach    GOALS:   Multidisciplinary Problems     Occupational Therapy Goals     Not on file          Multidisciplinary Problems (Resolved)        Problem: Occupational Therapy Goal    Goal Priority Disciplines Outcome Interventions   Occupational Therapy Goal   (Resolved)     OT, PT/OT Met                    History:     Past Medical History:   Diagnosis Date    Diabetes mellitus     Hypertension        Past Surgical History:   Procedure Laterality Date    INCISION AND DRAINAGE Left 7/25/2020    Procedure: INCISION AND DRAINAGE;  Surgeon: Lion Durham MD;  [facility];  Service: General;  Laterality: Left;  perineal and upper thigh       Time Tracking:     OT Date of Treatment: 07/31/20  OT Start Time: 1113  OT  Stop Time: 1122  OT Total Time (min): 9 min    Billable Minutes:Evaluation 9    Jill Cabrera OT  7/31/2020

## 2020-07-31 NOTE — PLAN OF CARE
07/31/20 1110   Final Note   Assessment Type Final Discharge Note   Anticipated Discharge Disposition Home   What phone number can be called within the next 1-3 days to see how you are doing after discharge? [phone]   Hospital Follow Up  Appt(s) scheduled? Yes   Discharge plans and expectations educations in teach back method with documentation complete? Yes   Right Care Referral Info   Post Acute Recommendation No Care   Post-Acute Status   Discharge Delays None known at this time     NATA taught Symptoms and Problems for Post-Op home care with pt with teach back:  1. Fever of 100.5 or higher, 2. Redness/swelling/ foul smell at incision site. NATA placed education sheet in [employer]..     Help at home will be from pt's significant other, assisting in pt's recovery.     NATA taught patient about things HE is responsible for when discharged TO HELP WITH HIS RECOVERY:  Particularly on how to Manage HIS Care At Home:  1. Getting his prescriptions filled.  2. Taking his medications as directed. DO NOT MISS ANY DOSES!  3. Going to his follow-up doctor appointments.   .  NATA informed pt's nurse, Daniela, that all CM needs have been met.

## 2020-07-31 NOTE — PROGRESS NOTES
Surgery Progress Note    Primary Problem: Necrotizing fasciitis    Other problems:   Active Hospital Problems    Diagnosis  POA    *Necrotizing fasciitis [M72.6]  Yes    MSSA (methicillin susceptible Staphylococcus aureus) [A49.01]  Yes    Tobacco user [Z72.0]  Yes    Anemia due to chronic kidney disease [N18.9, D63.1]  Yes    Essential hypertension [I10]  Yes    HLD (hyperlipidemia) [E78.5]  Yes    Diabetes mellitus type 1 [E10.9]  Yes    NORAH (acute kidney injury) [N17.9]  Yes    Sepsis [A41.9]  Yes      Resolved Hospital Problems   No resolved problems to display.       HD #  LOS: 6 days   POD #6 Days Post-Op status post I&D of soft tissue infection of thigh    Overnight events:  No acute events overnight.  Pain continues to improve.      Diet - Diet diabetic [facility]; 2000 Calorie; Renal     I/O last 3 completed shifts:  In: 3807.9 [P.O.:1080; I.V.:2577.9; IV Piggyback:150]  Out: 6050 [Urine:6050]    Intake/Output Summary (Last 24 hours) at 7/31/2020 0657  Last data filed at 7/30/2020 1725  Gross per 24 hour   Intake 3447.92 ml   Output 2750 ml   Net 697.92 ml       Temp:  [96.9 °F (36.1 °C)-98.7 °F (37.1 °C)] 96.9 °F (36.1 °C)  Pulse:  [81-99] 99  Resp:  [16-20] 19  SpO2:  [96 %-99 %] 98 %  BP: (135-162)/(74-83) 154/83    Gen: alert, well appearing, and in no distress,    Left thigh with clean base, no erythema, no purulence expressed    Recent Labs   Lab 07/24/20  1536 07/25/20  0528 07/25/20  1807 07/26/20  0520 07/27/20  0448 07/27/20  0449 07/28/20  0509 07/29/20  0728 07/30/20  0610 07/31/20  0317   WBC  --  22.00*  --  26.45* 24.94*  --  18.54* 13.90* 10.92 13.06*   HGB  --  9.8*  --  9.5* 9.5*  --  9.6* 9.4* 8.8* 9.3*   HCT  --  28.7*  --  28.2* 28.1*  --  27.8* 26.6* 26.0* 27.6*   PLT  --  194  --  212 263  --  320 460* 375* 422*   * 128* 127* 130*  --  127* 128* 133* 134* 139   K 3.4* 3.4* 4.2 3.8  --  3.5 3.2* 3.5 3.4* 3.7    99 99 98  --  95 98 106 107 111*   BUN 33* 37*  44* 55*  --  68* 67* 60* 52* 41*   * 249* 198* 272*  --  227* 174* 223* 271* 259*   PROT 6.4 5.8*  --   --   --  5.8*  --   --   --   --    ALBUMIN 1.6* 1.5* 1.4* 1.3*  --  1.4*  --   --   --   --    BILITOT 0.3 0.2  --   --   --  0.1  --   --   --   --    AST 12 10  --   --   --  14  --   --   --   --    ALKPHOS 106 156*  --   --   --  122  --   --   --   --    ALT 6* 9*  --   --   --  11  --   --   --   --         Assessment: Status post I and D soft tissue infection    Plan:  Continue the local wound care, wet-to-dry dressings b.i.d.  Continue antibiotics  Reimage if WBC continues to increase      Brodie Wright MD

## 2020-07-31 NOTE — PLAN OF CARE
WRITTEN HEALTHCARE DISCHARGE INFORMATION     Things that YOU are RESPONSIBLE for to Manage Your Care At Home:    1. Getting your prescriptions filled.  2. Taking you medications as directed. DO NOT MISS ANY DOSES!  3. Going to your follow-up doctor appointments. This is important because it allows the doctor to monitor your progress and to determine if any changes need to be made to your treatment plan.    If you are unable to make your follow up appointments, please call the number listed and reschedule this appointment.     ____________HELP AT HOME____________________    Experiencing any SIGNS or SYMPTOMS: YOU CAN    Schedule a same day appopintment with your Primary Care Doctor or  you can call [facility] On Call Nurse Care Line for 24/7 assistance at [phone]    If you are experience any signs or symptoms that have become severe, Call 911 and come to your nearest Emergency Room.    Thank you for choosing [facility] and allowing us to care for you.   From your care management team: Melissa Mares LMSW, [phone]  Follow-up Information     Brodie Wright MD On 8/5/2020.    Specialty: General Surgery  Why: Follow-Up: Wednesday, August 5, 2020 at 3:10PM  Contact information:  [address] [place] [address] [facility], [facility]  [place] [address] [phone]

## 2020-07-31 NOTE — CONSULTS
Follow-up Information     Brodie Wright MD On 8/5/2020.    Specialty: General Surgery  Why: Follow-Up: Wednesday, August 5, 2020 at 3:10PM  Contact information:  [address] [facility], [facility]  [place] [id number] [phone]

## 2020-07-31 NOTE — DISCHARGE SUMMARY
[facility] Hospital Medicine  Discharge Summary      Patient Name: Chas Henao  MRN: [id number]  Admission Date: 7/24/2020  Hospital Length of Stay: 6 days  Discharge Date and Time:  07/31/2020 12:14 PM  Attending Physician: Jules Goodwin MD   Discharging Provider: Jules Goodwin MD  Primary Care Provider: [facility]      HPI:   Chas Henao 35 y.o. male with type 1 diabetes, HTN, and HLD presents to the hospital with a chief complaint of leg pain. He reports 1 week ago he saw what he thought was a pimple to his left medial thigh that he attempted to express without success and the abscess expanded. Over the last week he has experienced an increasing constant throbbing pain without radiation improved with I&D of abscess in the ED and worsened with ambulation. He denies any trauma or injury to the leg. He has not lost control of his bowel/bladder. He denies fever, chest pain, SOB, N/V, abdominal pain, dysuria, dizziness, syncope.     In the ED, tachycardic to 105, WBC of 22, abscess seen on CT, lactic acid normal, Cr of 2.1.     Procedure(s) (LRB):  INCISION AND DRAINAGE (Left)      Hospital Course:   Chas Henao 35 y.o. male placed in observation for left upper thigh abscess, NORAH and sepsis.  I&D in ED. Rocephin/Flagyl/doxy started on admission. Patient received CT with contrast on admission. General surgery and Nephrology consulted. US renal showed normal side kidneys, bilateral kidneys and bilateral elevated renal indices. 7/25 Underwent I & D of left upper thigh area that showed some necrotizing fasciitis.  Antibiotics switched to vancomycin and meropenem IV.  Renal function continued to worsen.  On 07/26 urine output approximately 500 mL over 24 hr. Fever subsided, but persistent leukocytosis. Nephrology felt ATN and continue IVF.  Vanc stopped and patient started on Ancef.  ID recommending Ancef while inpatient and Cefadroxil upon discharge.  CT with no fluid  collections.  Improving leukocytosis and Creat finally trending downwards. He was discharged on cefodroxil to complete 14D of therapy and to follow up in Gen Surg clinic in 1 week.    Consults:   Consults (From admission, onward)        Status Ordering Provider     Inpatient consult to General Surgery  Once     Provider:  Lion Durham MD    Completed VIGNESH HARRELL     Inpatient consult to Infectious Diseases  Once     Provider:  Lorena Elliott MD    Completed DANAE PIERRE     Inpatient consult to Nephrology  Once     Provider:  Jorge Mackay MD    Completed LION DURHAM     Inpatient consult to Social Work  Once     Provider:  (Not yet assigned)    Completed KAREEM BETANCUR     Inpatient virtual consult to Hospital Medicine  Once     Provider:  (Not yet assigned)    Completed DANAE PIERRE          No new Assessment & Plan notes have been filed under this hospital service since the last note was generated.  Service: Hospital Medicine    Final Active Diagnoses:    Diagnosis Date Noted POA    PRINCIPAL PROBLEM:  Necrotizing fasciitis [M72.6] 07/24/2020 Yes    MSSA (methicillin susceptible Staphylococcus aureus) [A49.01] 07/27/2020 Yes    Tobacco user [Z72.0] 07/26/2020 Yes    Anemia due to chronic kidney disease [N18.9, D63.1] 07/25/2020 Yes    Essential hypertension [I10] 07/24/2020 Yes    HLD (hyperlipidemia) [E78.5] 07/24/2020 Yes    Diabetes mellitus type 1 [E10.9] 07/24/2020 Yes    NORAH (acute kidney injury) [N17.9] 07/24/2020 Yes    Sepsis [A41.9] 07/24/2020 Yes      Problems Resolved During this Admission:       Discharged Condition: good    Disposition: Home or Self Care    Follow Up:  Follow-up Information     Brodie Wright MD On 8/5/2020.    Specialty: General Surgery  Why: Follow-Up: Wednesday, August 5, 2020 at 3:10PM  Contact information:  [address] [place] [address] [facility], [facility]  [place] [address] [phone]                 Patient Instructions:       Diet diabetic     Notify your health care provider if you experience any of the following:  severe uncontrolled pain     Notify your health care provider if you experience any of the following:  temperature >100.4     Change dressing (specify)   Order Comments: Dressing change: once daily and keep clean and dry     Activity as tolerated       Significant Diagnostic Studies: Surgical cultures with MSSA and group B strep    CT abd  1. Findings suggesting cellulitis involving the posteromedial proximal thigh soft tissues, with subcutaneous strand-like edema, extending to the fascial surface.  There is punctate subcutaneous emphysema within the superficial subcutaneous fat, adjacent to a small amount of disorganized fluid.  No discrete formed abscess at this time, however developing abscess is a consideration versus previously drained abscess.  Correlation is advised.  2. Right lower lobe pulmonary nodule.  Comparison with any previous examinations would be helpful.  For a solid nodule >8 mm, Fleischner Society 2017 guidelines recommend considering CT, PET/CT or tissue sampling at 3 months.  3. Probable left renal cyst however nonemergent ultrasound is recommended to confirm cystic nature.  4. Findings suggesting hepatic steatosis, correlation with LFTs recommended.  5. Mild bilateral perinephric fat stranding, nonspecific, correlation with urinalysis advised.  6. Several additional findings above.    CT Abd  No fluid collections identified.     Anasarca.     Mild bibasilar atelectasis with trace pleural effusions.     Trace amount of ascites.     Right lower lobe 10 mm pulmonary nodule, unchanged from the 07/24/2020 CT exam.  For a solid nodule >8 mm, Fleischner Society 2017 guidelines recommend considering CT, PET/CT or tissue sampling at 3 months.    US Retroperitoneal  Bilateral elevated renal indices.     Bilateral renal cysts.    TTE  · Hyperdynamic left ventricular systolic function. The estimated ejection  fraction is 70%.  · Moderate-severe concentric left ventricular hypertrophy.  · Grade I (mild) left ventricular diastolic dysfunction consistent with impaired relaxation.  · Mild mitral regurgitation.  · Normal central venous pressure (3 mmHg).  · The estimated PA systolic pressure is 63 mmHg.  · Pulmonary hypertension present.  · Normal right ventricular systolic function.  · Mild left atrial enlargement.    Pending Diagnostic Studies:     None         Medications:  Reconciled Home Medications:      Medication List      START taking these medications    amLODIPine 10 MG tablet  Commonly known as: NORVASC  Take 1 tablet (10 mg total) by mouth once daily.  Start taking on: August 1, 2020     cefadroxil 500 MG Cap  Commonly known as: DURICEF  Take 2 capsules (1 g total) by mouth every 12 (twelve) hours. for 7 days     ondansetron 4 MG tablet  Commonly known as: ZOFRAN  Take 1 tablet (4 mg total) by mouth 2 (two) times daily.     oxyCODONE-acetaminophen 5-325 mg per tablet  Commonly known as: PERCOCET  Take 1 tablet by mouth every 4 (four) hours as needed for Pain.     senna-docusate 8.6-50 mg 8.6-50 mg per tablet  Commonly known as: PERICOLACE  Take 1 tablet by mouth daily as needed for Constipation.        CONTINUE taking these medications    atorvastatin 20 MG tablet  Commonly known as: LIPITOR  Take 20 mg by mouth every evening.     insulin aspart U-100 100 unit/mL injection  Commonly known as: NOVOLOG  Inject into the skin 3 (three) times daily before meals.        STOP taking these medications    losartan 50 MG tablet  Commonly known as: COZAAR     UNKNOWN TO PATIENT            Indwelling Lines/Drains at time of discharge:   Lines/Drains/Airways     None                 Time spent on the discharge of patient: 34 minutes  Patient was seen and examined on the date of discharge and determined to be suitable for discharge.         Jules Goodwin MD  Department of Hospital Medicine  [facility]

## 2020-07-31 NOTE — NURSING
Pt awake, alert, oriented x4. able to make needs known. ambulatory. BSG monitored with supplemental insulin adm as ordered. pt on ABX. pain controlled with PRN pain medication. IV fluids continue as ordered. denies pain/distress/sob at this time. refused BID dressing change. will continue to monitor.

## 2020-07-31 NOTE — PLAN OF CARE
Problem: Occupational Therapy Goal  Goal: Occupational Therapy Goal  Outcome: Met   OT eval is complete. The patient is at his functional baseline and no OT is recommended.   The patient denies need for DME at home. The patient was provided a handout re: Home Safety. OT will D/C.

## 2020-07-31 NOTE — NURSING
VSS, NAD, Afebrile, voids without difficulty per urinal. Discharge instructions and prescription paper explained and handed to pt. Pt verbalizes understanding. IV access dc. Wound care supplies given to pt. Discharged home via wheelchair, surgical mask in place.

## 2020-08-03 ENCOUNTER — PATIENT OUTREACH (OUTPATIENT)
Dept: ADMINISTRATIVE | Facility: CLINIC | Age: 35
End: 2020-08-03

## 2020-08-03 NOTE — PATIENT INSTRUCTIONS
Abscess (Incision & Drainage)  An abscess is sometimes called a boil. It happens when bacteria get trapped under the skin and start to grow. Pus forms inside the abscess as the body responds to the bacteria. An abscess can happen with an insect bite, ingrown hair, blocked oil gland, pimple, cyst, or puncture wound.  Your healthcare provider has drained the pus from your abscess. If the abscess pocket was large, your healthcare provider may have put in gauze packing. Your provider will need to remove it on your next visit. He or she may also replace it at that time. You may not need antibiotics to treat a simple abscess, unless the infection is spreading into the skin around the wound (cellulitis).  The wound will take about 1 to 2 weeks to heal, depending on the size of the abscess. Healthy tissue will grow from the bottom and sides of the opening until it seals over.  Home care  These tips can help your wound heal:  · The wound may drain for the first 2 days. Cover the wound with a clean dry dressing. Change the dressing if it becomes soaked with blood or pus.  · If a gauze packing was placed inside the abscess pocket, you may be told to remove it yourself. You may do this in the shower. Once the packing is removed, you should wash the area in the shower, or clean the area as directed by your provider. Continue to do this until the skin opening has closed. Make sure you wash your hands after changing the packing or cleaning the wound.  · If you were prescribed antibiotics, take them as directed until they are all gone.  · You may use acetaminophen or ibuprofen to control pain, unless another pain medicine was prescribed. If you have liver disease or ever had a stomach ulcer, talk with your doctor before using these medicines.  Follow-up care  Follow up with your healthcare provider, or as advised. If a gauze packing was put in your wound, it should be removed in 1 to 2 days. Check your wound every day for any  signs that the infection is getting worse. The signs are listed below.  When to seek medical advice  Call your healthcare provider right away if any of these occur:  · Increasing redness or swelling  · Red streaks in the skin leading away from the wound  · Increasing local pain or swelling  · Continued pus draining from the wound 2 days after treatment  · Fever of 100.4ºF (38ºC) or higher, or as directed by your healthcare provider  · Boil returns when you are at home    © 2458-4076 [employer]. [address] [place] [address], [place] [address]. All rights reserved. This information is not intended as a substitute for professional medical care. Always follow your healthcare professional's instructions.

## 2020-08-07 ENCOUNTER — NURSE TRIAGE (OUTPATIENT)
Dept: ADMINISTRATIVE | Facility: CLINIC | Age: 35
End: 2020-08-07

## 2020-08-07 NOTE — TELEPHONE ENCOUNTER
RN attempted to contact pt x2 through the Post Procedural Symptom Tracker for Day 13.  Unable to reach pt.  No additional calls or action needed at this time per protocol.      Reason for Disposition   Second attempt to contact family AND no contact made. Phone number verified.    Protocols used: NO CONTACT OR DUPLICATE CONTACT CALL-A-OH

## 2021-07-26 PROCEDURE — 82962 GLUCOSE BLOOD TEST: CPT

## 2021-07-26 PROCEDURE — U0002 COVID-19 LAB TEST NON-CDC: HCPCS | Performed by: EMERGENCY MEDICINE

## 2021-07-26 PROCEDURE — 99283 EMERGENCY DEPT VISIT LOW MDM: CPT | Mod: 25

## 2021-07-27 ENCOUNTER — HOSPITAL ENCOUNTER (EMERGENCY)
Facility: HOSPITAL | Age: 36
Discharge: HOME | End: 2021-07-27
Attending: EMERGENCY MEDICINE
Payer: MEDICAID

## 2021-07-27 VITALS
BODY MASS INDEX: 28.23 KG/M2 | HEART RATE: 96 BPM | RESPIRATION RATE: 18 BRPM | TEMPERATURE: 98 F | HEIGHT: 74 IN | WEIGHT: 220 LBS | DIASTOLIC BLOOD PRESSURE: 98 MMHG | SYSTOLIC BLOOD PRESSURE: 201 MMHG | OXYGEN SATURATION: 99 %

## 2021-07-27 DIAGNOSIS — U07.1 COVID-19: Primary | ICD-10-CM

## 2021-07-27 DIAGNOSIS — I10 UNCONTROLLED HYPERTENSION: ICD-10-CM

## 2021-07-27 LAB
CTP QC/QA: YES
POCT GLUCOSE: 152 MG/DL (ref 70–110)
SARS-COV-2 RDRP RESP QL NAA+PROBE: POSITIVE

## 2021-07-27 RX ORDER — ALBUTEROL SULFATE 90 UG/1
2 AEROSOL, METERED RESPIRATORY (INHALATION) EVERY 4 HOURS PRN
Qty: 8 G | Refills: 0 | Status: SHIPPED | OUTPATIENT
Start: 2021-07-27 | End: 2022-07-27

## 2021-07-27 RX ORDER — FLUTICASONE PROPIONATE 50 MCG
2 SPRAY, SUSPENSION (ML) NASAL DAILY PRN
Qty: 15 G | Refills: 0 | Status: SHIPPED | OUTPATIENT
Start: 2021-07-27

## 2021-07-27 RX ORDER — BENZONATATE 200 MG/1
200 CAPSULE ORAL 3 TIMES DAILY PRN
Qty: 30 CAPSULE | Refills: 0 | Status: SHIPPED | OUTPATIENT
Start: 2021-07-27 | End: 2021-08-06

## 2021-07-27 RX ORDER — LOSARTAN POTASSIUM 50 MG/1
50 TABLET ORAL DAILY
Qty: 30 TABLET | Refills: 1 | Status: SHIPPED | OUTPATIENT
Start: 2021-07-27 | End: 2021-08-26

## 2024-10-31 DIAGNOSIS — L29.9 PRURITUS: Primary | ICD-10-CM

## 2025-02-28 ENCOUNTER — PATIENT OUTREACH (OUTPATIENT)
Dept: ADMINISTRATIVE | Facility: CLINIC | Age: 40
End: 2025-02-28
Payer: MEDICAID

## (undated) DEVICE — GAUGE FLUFF X-SUPER 36X36 2PLY

## (undated) DEVICE — SPONGE DERMACEA GAUZE 4X4

## (undated) DEVICE — PAD ABD 8X10 STERILE